# Patient Record
Sex: FEMALE | Race: WHITE | NOT HISPANIC OR LATINO
[De-identification: names, ages, dates, MRNs, and addresses within clinical notes are randomized per-mention and may not be internally consistent; named-entity substitution may affect disease eponyms.]

---

## 2017-03-07 ENCOUNTER — APPOINTMENT (OUTPATIENT)
Dept: OBGYN | Facility: CLINIC | Age: 32
End: 2017-03-07

## 2017-03-07 VITALS
WEIGHT: 205 LBS | BODY MASS INDEX: 34.16 KG/M2 | HEIGHT: 65 IN | DIASTOLIC BLOOD PRESSURE: 80 MMHG | SYSTOLIC BLOOD PRESSURE: 118 MMHG

## 2017-03-07 DIAGNOSIS — Z12.4 ENCOUNTER FOR GYNECOLOGICAL EXAMINATION (GENERAL) (ROUTINE) W/OUT ABNORMAL FINDINGS: ICD-10-CM

## 2017-03-07 DIAGNOSIS — Z01.419 ENCOUNTER FOR GYNECOLOGICAL EXAMINATION (GENERAL) (ROUTINE) W/OUT ABNORMAL FINDINGS: ICD-10-CM

## 2017-03-09 LAB — HPV HIGH+LOW RISK DNA PNL CVX: NEGATIVE

## 2017-03-10 LAB — CYTOLOGY CVX/VAG DOC THIN PREP: NORMAL

## 2017-11-01 ENCOUNTER — RX RENEWAL (OUTPATIENT)
Age: 32
End: 2017-11-01

## 2017-11-15 ENCOUNTER — MOBILE ON CALL (OUTPATIENT)
Age: 32
End: 2017-11-15

## 2017-11-25 ENCOUNTER — RX RENEWAL (OUTPATIENT)
Age: 32
End: 2017-11-25

## 2017-12-14 ENCOUNTER — APPOINTMENT (OUTPATIENT)
Dept: GASTROENTEROLOGY | Facility: CLINIC | Age: 32
End: 2017-12-14

## 2018-02-22 ENCOUNTER — APPOINTMENT (OUTPATIENT)
Dept: GASTROENTEROLOGY | Facility: CLINIC | Age: 33
End: 2018-02-22
Payer: COMMERCIAL

## 2018-02-22 VITALS
SYSTOLIC BLOOD PRESSURE: 134 MMHG | OXYGEN SATURATION: 98 % | WEIGHT: 189 LBS | RESPIRATION RATE: 16 BRPM | HEART RATE: 77 BPM | BODY MASS INDEX: 31.49 KG/M2 | DIASTOLIC BLOOD PRESSURE: 75 MMHG | HEIGHT: 65 IN

## 2018-02-22 PROCEDURE — 99214 OFFICE O/P EST MOD 30 MIN: CPT

## 2018-02-27 ENCOUNTER — APPOINTMENT (OUTPATIENT)
Dept: MRI IMAGING | Facility: CLINIC | Age: 33
End: 2018-02-27

## 2018-02-27 ENCOUNTER — OUTPATIENT (OUTPATIENT)
Dept: OUTPATIENT SERVICES | Facility: HOSPITAL | Age: 33
LOS: 1 days | End: 2018-02-27
Payer: COMMERCIAL

## 2018-02-27 DIAGNOSIS — K50.80 CROHN'S DISEASE OF BOTH SMALL AND LARGE INTESTINE WITHOUT COMPLICATIONS: ICD-10-CM

## 2018-02-27 PROCEDURE — A9585: CPT

## 2018-02-27 PROCEDURE — 72197 MRI PELVIS W/O & W/DYE: CPT

## 2018-02-27 PROCEDURE — 74183 MRI ABD W/O CNTR FLWD CNTR: CPT

## 2018-02-27 PROCEDURE — 74183 MRI ABD W/O CNTR FLWD CNTR: CPT | Mod: 26

## 2018-02-27 PROCEDURE — 72197 MRI PELVIS W/O & W/DYE: CPT | Mod: 26

## 2018-03-02 ENCOUNTER — RX RENEWAL (OUTPATIENT)
Age: 33
End: 2018-03-02

## 2018-03-05 ENCOUNTER — CLINICAL ADVICE (OUTPATIENT)
Age: 33
End: 2018-03-05

## 2018-03-05 LAB
ALBUMIN SERPL ELPH-MCNC: 4 G/DL
ALP BLD-CCNC: 97 U/L
ALT SERPL-CCNC: 13 U/L
ANION GAP SERPL CALC-SCNC: 12 MMOL/L
AST SERPL-CCNC: 15 U/L
BASOPHILS # BLD AUTO: 0.02 K/UL
BASOPHILS NFR BLD AUTO: 0.1 %
BILIRUB SERPL-MCNC: 0.3 MG/DL
BUN SERPL-MCNC: 6 MG/DL
CALCIUM SERPL-MCNC: 9.4 MG/DL
CHLORIDE SERPL-SCNC: 101 MMOL/L
CO2 SERPL-SCNC: 25 MMOL/L
CREAT SERPL-MCNC: 0.78 MG/DL
CRP SERPL-MCNC: 2 MG/DL
EOSINOPHIL # BLD AUTO: 0.17 K/UL
EOSINOPHIL NFR BLD AUTO: 1.2 %
GLUCOSE SERPL-MCNC: 94 MG/DL
HCT VFR BLD CALC: 42 %
HGB BLD-MCNC: 13.5 G/DL
IMM GRANULOCYTES NFR BLD AUTO: 0.4 %
LYMPHOCYTES # BLD AUTO: 3.34 K/UL
LYMPHOCYTES NFR BLD AUTO: 23.4 %
MAN DIFF?: NORMAL
MCHC RBC-ENTMCNC: 29.2 PG
MCHC RBC-ENTMCNC: 32.1 GM/DL
MCV RBC AUTO: 90.9 FL
MONOCYTES # BLD AUTO: 1.04 K/UL
MONOCYTES NFR BLD AUTO: 7.3 %
NEUTROPHILS # BLD AUTO: 9.66 K/UL
NEUTROPHILS NFR BLD AUTO: 67.6 %
PLATELET # BLD AUTO: 480 K/UL
POTASSIUM SERPL-SCNC: 4.8 MMOL/L
PROT SERPL-MCNC: 7.6 G/DL
RBC # BLD: 4.62 M/UL
RBC # FLD: 13.9 %
SODIUM SERPL-SCNC: 138 MMOL/L
WBC # FLD AUTO: 14.29 K/UL

## 2018-03-09 LAB
ADJUSTED MITOGEN: 3.49 IU/ML
ADJUSTED TB AG: -0.03 IU/ML
M TB IFN-G BLD-IMP: NEGATIVE
QUANTIFERON GOLD NIL: 0.13 IU/ML

## 2018-03-16 ENCOUNTER — APPOINTMENT (OUTPATIENT)
Dept: GASTROENTEROLOGY | Facility: CLINIC | Age: 33
End: 2018-03-16
Payer: COMMERCIAL

## 2018-03-16 DIAGNOSIS — Z87.42 PERSONAL HISTORY OF OTHER DISEASES OF THE FEMALE GENITAL TRACT: ICD-10-CM

## 2018-03-16 DIAGNOSIS — Z87.19 PERSONAL HISTORY OF OTHER DISEASES OF THE DIGESTIVE SYSTEM: ICD-10-CM

## 2018-03-16 PROCEDURE — 96415 CHEMO IV INFUSION ADDL HR: CPT | Mod: 59

## 2018-03-16 PROCEDURE — 96413 CHEMO IV INFUSION 1 HR: CPT

## 2018-03-16 PROCEDURE — 99213 OFFICE O/P EST LOW 20 MIN: CPT | Mod: 25

## 2018-03-16 RX ORDER — ADALIMUMAB 40MG/0.8ML
40 KIT SUBCUTANEOUS
Qty: 1 | Refills: 6 | Status: DISCONTINUED | COMMUNITY
Start: 2017-11-25 | End: 2018-03-16

## 2018-04-02 ENCOUNTER — APPOINTMENT (OUTPATIENT)
Dept: GASTROENTEROLOGY | Facility: CLINIC | Age: 33
End: 2018-04-02
Payer: COMMERCIAL

## 2018-04-02 PROCEDURE — 99213 OFFICE O/P EST LOW 20 MIN: CPT | Mod: 25

## 2018-04-02 PROCEDURE — 96413 CHEMO IV INFUSION 1 HR: CPT

## 2018-04-02 PROCEDURE — 96415 CHEMO IV INFUSION ADDL HR: CPT

## 2018-04-04 ENCOUNTER — MOBILE ON CALL (OUTPATIENT)
Age: 33
End: 2018-04-04

## 2018-04-05 VITALS
HEART RATE: 80 BPM | DIASTOLIC BLOOD PRESSURE: 70 MMHG | BODY MASS INDEX: 31.49 KG/M2 | RESPIRATION RATE: 20 BRPM | OXYGEN SATURATION: 100 % | SYSTOLIC BLOOD PRESSURE: 116 MMHG | WEIGHT: 189 LBS | HEIGHT: 65 IN

## 2018-04-06 ENCOUNTER — TRANSCRIPTION ENCOUNTER (OUTPATIENT)
Age: 33
End: 2018-04-06

## 2018-05-01 ENCOUNTER — APPOINTMENT (OUTPATIENT)
Dept: GASTROENTEROLOGY | Facility: CLINIC | Age: 33
End: 2018-05-01

## 2018-05-04 ENCOUNTER — APPOINTMENT (OUTPATIENT)
Dept: GASTROENTEROLOGY | Facility: CLINIC | Age: 33
End: 2018-05-04
Payer: COMMERCIAL

## 2018-05-04 VITALS
BODY MASS INDEX: 31.65 KG/M2 | WEIGHT: 190 LBS | HEIGHT: 65 IN | HEART RATE: 80 BPM | TEMPERATURE: 98 F | SYSTOLIC BLOOD PRESSURE: 120 MMHG | RESPIRATION RATE: 12 BRPM | DIASTOLIC BLOOD PRESSURE: 70 MMHG

## 2018-05-04 PROCEDURE — 96415 CHEMO IV INFUSION ADDL HR: CPT | Mod: 59

## 2018-05-04 PROCEDURE — 96413 CHEMO IV INFUSION 1 HR: CPT

## 2018-05-04 PROCEDURE — 99213 OFFICE O/P EST LOW 20 MIN: CPT | Mod: 25

## 2018-05-04 RX ORDER — METRONIDAZOLE 500 MG/1
500 TABLET ORAL 3 TIMES DAILY
Qty: 42 | Refills: 0 | Status: DISCONTINUED | COMMUNITY
Start: 2018-03-02 | End: 2018-05-04

## 2018-05-04 RX ORDER — CIPROFLOXACIN HYDROCHLORIDE 500 MG/1
500 TABLET, FILM COATED ORAL
Qty: 28 | Refills: 0 | Status: DISCONTINUED | COMMUNITY
Start: 2018-03-02 | End: 2018-05-04

## 2018-05-04 RX ORDER — PREDNISONE 5 MG/1
5 TABLET ORAL TWICE DAILY
Qty: 240 | Refills: 0 | Status: DISCONTINUED | COMMUNITY
Start: 2017-11-01 | End: 2018-05-04

## 2018-06-26 ENCOUNTER — APPOINTMENT (OUTPATIENT)
Dept: GASTROENTEROLOGY | Facility: CLINIC | Age: 33
End: 2018-06-26
Payer: COMMERCIAL

## 2018-06-26 PROCEDURE — 96413 CHEMO IV INFUSION 1 HR: CPT

## 2018-06-26 PROCEDURE — 96415 CHEMO IV INFUSION ADDL HR: CPT

## 2018-06-26 PROCEDURE — 99213 OFFICE O/P EST LOW 20 MIN: CPT | Mod: 25

## 2018-08-14 ENCOUNTER — APPOINTMENT (OUTPATIENT)
Dept: GASTROENTEROLOGY | Facility: CLINIC | Age: 33
End: 2018-08-14
Payer: COMMERCIAL

## 2018-08-14 PROCEDURE — 99213 OFFICE O/P EST LOW 20 MIN: CPT | Mod: 25

## 2018-08-14 PROCEDURE — 96415 CHEMO IV INFUSION ADDL HR: CPT

## 2018-08-14 PROCEDURE — 96413 CHEMO IV INFUSION 1 HR: CPT

## 2018-08-17 VITALS
HEIGHT: 65 IN | BODY MASS INDEX: 31.65 KG/M2 | TEMPERATURE: 98 F | HEART RATE: 80 BPM | WEIGHT: 190 LBS | SYSTOLIC BLOOD PRESSURE: 124 MMHG | RESPIRATION RATE: 12 BRPM | DIASTOLIC BLOOD PRESSURE: 74 MMHG

## 2018-10-02 ENCOUNTER — APPOINTMENT (OUTPATIENT)
Dept: GASTROENTEROLOGY | Facility: CLINIC | Age: 33
End: 2018-10-02
Payer: COMMERCIAL

## 2018-10-02 PROCEDURE — 99213 OFFICE O/P EST LOW 20 MIN: CPT | Mod: 25

## 2018-10-02 PROCEDURE — 96415 CHEMO IV INFUSION ADDL HR: CPT

## 2018-10-02 PROCEDURE — 96413 CHEMO IV INFUSION 1 HR: CPT

## 2018-11-27 ENCOUNTER — APPOINTMENT (OUTPATIENT)
Dept: GASTROENTEROLOGY | Facility: CLINIC | Age: 33
End: 2018-11-27
Payer: COMMERCIAL

## 2018-11-27 PROCEDURE — 96413 CHEMO IV INFUSION 1 HR: CPT

## 2018-11-27 PROCEDURE — 96415 CHEMO IV INFUSION ADDL HR: CPT | Mod: 59

## 2018-11-27 PROCEDURE — 99213 OFFICE O/P EST LOW 20 MIN: CPT | Mod: 25

## 2018-11-28 ENCOUNTER — CLINICAL ADVICE (OUTPATIENT)
Age: 33
End: 2018-11-28

## 2019-01-22 ENCOUNTER — APPOINTMENT (OUTPATIENT)
Dept: GASTROENTEROLOGY | Facility: CLINIC | Age: 34
End: 2019-01-22
Payer: COMMERCIAL

## 2019-01-22 PROCEDURE — 96413 CHEMO IV INFUSION 1 HR: CPT

## 2019-01-22 PROCEDURE — 96415 CHEMO IV INFUSION ADDL HR: CPT

## 2019-01-22 PROCEDURE — 99213 OFFICE O/P EST LOW 20 MIN: CPT | Mod: 25

## 2019-01-22 NOTE — ASSESSMENT
[FreeTextEntry1] : patient will get her usual infusion of Remicade have a follow infusion and 8 weeks. We will also review her recent blood work.

## 2019-01-22 NOTE — HISTORY OF PRESENT ILLNESS
[de-identified] : patient history of Crohn's disease on Remicade 5 mg per kilogram. The patient is symptomatically got diarrhea abdominal pain fever chills or extra intestinal manifestations of her disease.Patient states she had recent blood work which is not available today

## 2019-01-22 NOTE — PHYSICAL EXAM
[General Appearance - Alert] : alert [General Appearance - In No Acute Distress] : in no acute distress [Sclera] : the sclera and conjunctiva were normal [PERRL With Normal Accommodation] : pupils were equal in size, round, and reactive to light [Extraocular Movements] : extraocular movements were intact [Auscultation Breath Sounds / Voice Sounds] : lungs were clear to auscultation bilaterally [Heart Rate And Rhythm] : heart rate was normal and rhythm regular [Heart Sounds] : normal S1 and S2 [Heart Sounds Gallop] : no gallops [Murmurs] : no murmurs [Heart Sounds Pericardial Friction Rub] : no pericardial rub [Bowel Sounds] : normal bowel sounds [Abdomen Soft] : soft [Abdomen Tenderness] : non-tender [Abdomen Mass (___ Cm)] : no abdominal mass palpated [Musculoskeletal - Swelling] : no joint swelling seen [] : no rash [Oriented To Time, Place, And Person] : oriented to person, place, and time [Impaired Insight] : insight and judgment were intact [Affect] : the affect was normal

## 2019-03-20 ENCOUNTER — TRANSCRIPTION ENCOUNTER (OUTPATIENT)
Age: 34
End: 2019-03-20

## 2019-03-20 ENCOUNTER — RX RENEWAL (OUTPATIENT)
Age: 34
End: 2019-03-20

## 2019-03-20 ENCOUNTER — APPOINTMENT (OUTPATIENT)
Dept: GASTROENTEROLOGY | Facility: CLINIC | Age: 34
End: 2019-03-20
Payer: COMMERCIAL

## 2019-03-20 PROCEDURE — 96413 CHEMO IV INFUSION 1 HR: CPT

## 2019-03-20 PROCEDURE — 96415 CHEMO IV INFUSION ADDL HR: CPT | Mod: 59

## 2019-03-20 PROCEDURE — 99213 OFFICE O/P EST LOW 20 MIN: CPT | Mod: 25

## 2019-03-20 NOTE — HISTORY OF PRESENT ILLNESS
[de-identified] : She is here for her every eight-week infusion of Remicade at a dose of 5 mg per kilogram for underlying Crohn's ileocolitis. She has had a good response to this medication is essentially asymptomatic. She is tolerating the infusion without difficulty.

## 2019-03-20 NOTE — PHYSICAL EXAM
[General Appearance - Alert] : alert [General Appearance - In No Acute Distress] : in no acute distress [General Appearance - Well Developed] : well developed [General Appearance - Well Nourished] : well nourished [General Appearance - Well-Appearing] : healthy appearing [Extraocular Movements] : extraocular movements were intact [PERRL With Normal Accommodation] : pupils were equal in size, round, and reactive to light [Sclera] : the sclera and conjunctiva were normal [Outer Ear] : the ears and nose were normal in appearance [Hearing Threshold Finger Rub Not [place]] : hearing was normal [Examination Of The Oral Cavity] : the lips and gums were normal [Neck Appearance] : the appearance of the neck was normal [Bowel Sounds] : normal bowel sounds [Heart Rate And Rhythm] : heart rate was normal and rhythm regular [Abdomen Tenderness] : non-tender [Abdomen Soft] : soft [Abdomen Mass (___ Cm)] : no abdominal mass palpated [Abnormal Walk] : normal gait [Nail Clubbing] : no clubbing  or cyanosis of the fingernails [Musculoskeletal - Swelling] : no joint swelling seen [Skin Color & Pigmentation] : normal skin color and pigmentation [Skin Turgor] : normal skin turgor [] : no rash [Motor Exam] : the motor exam was normal [No Focal Deficits] : no focal deficits [Oriented To Time, Place, And Person] : oriented to person, place, and time [Impaired Insight] : insight and judgment were intact [Affect] : the affect was normal

## 2019-03-28 LAB
ALBUMIN SERPL ELPH-MCNC: 3.8 G/DL
ALP BLD-CCNC: 89 U/L
ALT SERPL-CCNC: 16 U/L
ANION GAP SERPL CALC-SCNC: 10 MMOL/L
AST SERPL-CCNC: 14 U/L
BASOPHILS # BLD AUTO: 0.04 K/UL
BASOPHILS NFR BLD AUTO: 0.4 %
BILIRUB SERPL-MCNC: 0.2 MG/DL
BUN SERPL-MCNC: 7 MG/DL
CALCIUM SERPL-MCNC: 8.8 MG/DL
CHLORIDE SERPL-SCNC: 103 MMOL/L
CO2 SERPL-SCNC: 24 MMOL/L
CREAT SERPL-MCNC: 0.7 MG/DL
EOSINOPHIL # BLD AUTO: 0.14 K/UL
EOSINOPHIL NFR BLD AUTO: 1.2 %
GLUCOSE SERPL-MCNC: 67 MG/DL
HCT VFR BLD CALC: 41.9 %
HGB BLD-MCNC: 13.4 G/DL
IMM GRANULOCYTES NFR BLD AUTO: 0.3 %
LYMPHOCYTES # BLD AUTO: 3.14 K/UL
LYMPHOCYTES NFR BLD AUTO: 27.7 %
MAN DIFF?: NORMAL
MCHC RBC-ENTMCNC: 29 PG
MCHC RBC-ENTMCNC: 32 GM/DL
MCV RBC AUTO: 90.7 FL
MONOCYTES # BLD AUTO: 0.67 K/UL
MONOCYTES NFR BLD AUTO: 5.9 %
NEUTROPHILS # BLD AUTO: 7.32 K/UL
NEUTROPHILS NFR BLD AUTO: 64.5 %
PLATELET # BLD AUTO: 429 K/UL
POTASSIUM SERPL-SCNC: 4.6 MMOL/L
PROT SERPL-MCNC: 7 G/DL
RBC # BLD: 4.62 M/UL
RBC # FLD: 12.5 %
SODIUM SERPL-SCNC: 137 MMOL/L
WBC # FLD AUTO: 11.34 K/UL

## 2019-04-02 ENCOUNTER — RESULT REVIEW (OUTPATIENT)
Age: 34
End: 2019-04-02

## 2019-04-02 LAB
M TB IFN-G BLD-IMP: NEGATIVE
QUANTIFERON TB PLUS MITOGEN MINUS NIL: 5.35 IU/ML
QUANTIFERON TB PLUS NIL: 0.03 IU/ML
QUANTIFERON TB PLUS TB1 MINUS NIL: 0.01 IU/ML
QUANTIFERON TB PLUS TB2 MINUS NIL: 0 IU/ML

## 2019-05-15 ENCOUNTER — APPOINTMENT (OUTPATIENT)
Dept: GASTROENTEROLOGY | Facility: CLINIC | Age: 34
End: 2019-05-15
Payer: COMMERCIAL

## 2019-05-15 PROCEDURE — 96415 CHEMO IV INFUSION ADDL HR: CPT

## 2019-05-15 PROCEDURE — 96413 CHEMO IV INFUSION 1 HR: CPT

## 2019-05-15 PROCEDURE — 99213 OFFICE O/P EST LOW 20 MIN: CPT | Mod: 25

## 2019-05-15 NOTE — HISTORY OF PRESENT ILLNESS
[de-identified] : The patient is here for every a week infusion of Remicade at a dose of 5 mg per kilogram for her underlying Crohn's ileocolitis. She is tolerating infusion without any difficulty and is feeling well with no gastrointestinal complaints.

## 2019-05-15 NOTE — ASSESSMENT
[FreeTextEntry1] : At this time she is doing well we will simply continue with the same medical regimen.

## 2019-05-15 NOTE — PHYSICAL EXAM
[General Appearance - Alert] : alert [General Appearance - In No Acute Distress] : in no acute distress [General Appearance - Well Nourished] : well nourished [General Appearance - Well Developed] : well developed [General Appearance - Well-Appearing] : healthy appearing [Sclera] : the sclera and conjunctiva were normal [PERRL With Normal Accommodation] : pupils were equal in size, round, and reactive to light [Extraocular Movements] : extraocular movements were intact [Outer Ear] : the ears and nose were normal in appearance [Hearing Threshold Finger Rub Not [place]] : hearing was normal [Examination Of The Oral Cavity] : the lips and gums were normal [Neck Appearance] : the appearance of the neck was normal [Heart Rate And Rhythm] : heart rate was normal and rhythm regular [Bowel Sounds] : normal bowel sounds [Abdomen Soft] : soft [Abdomen Tenderness] : non-tender [Abdomen Mass (___ Cm)] : no abdominal mass palpated [Musculoskeletal - Swelling] : no joint swelling seen [Abnormal Walk] : normal gait [Nail Clubbing] : no clubbing  or cyanosis of the fingernails [Skin Turgor] : normal skin turgor [Skin Color & Pigmentation] : normal skin color and pigmentation [] : no rash [Motor Exam] : the motor exam was normal [No Focal Deficits] : no focal deficits [Affect] : the affect was normal [Impaired Insight] : insight and judgment were intact [Oriented To Time, Place, And Person] : oriented to person, place, and time

## 2019-06-19 ENCOUNTER — MOBILE ON CALL (OUTPATIENT)
Age: 34
End: 2019-06-19

## 2019-06-27 ENCOUNTER — APPOINTMENT (OUTPATIENT)
Dept: GASTROENTEROLOGY | Facility: CLINIC | Age: 34
End: 2019-06-27
Payer: COMMERCIAL

## 2019-06-27 VITALS
HEART RATE: 82 BPM | SYSTOLIC BLOOD PRESSURE: 119 MMHG | HEIGHT: 65 IN | BODY MASS INDEX: 33.32 KG/M2 | WEIGHT: 200 LBS | OXYGEN SATURATION: 99 % | DIASTOLIC BLOOD PRESSURE: 83 MMHG | RESPIRATION RATE: 14 BRPM

## 2019-06-27 PROCEDURE — 99214 OFFICE O/P EST MOD 30 MIN: CPT

## 2019-06-27 NOTE — HISTORY OF PRESENT ILLNESS
[de-identified] : patient knows Crohn's ileocolitis being treated with Remicade 5 mg per kilogram every 8 weeks has been doing well until a few weeks ago started noting some lower abdominal pain usually after meals. The symptoms do not wake her from sleep at night. She had no diarrhea rectal bleeding fever chills joint pains rashes or extra intestinal manifestations of her disease. Undergoing a lot of stress due to family issues. Patient did have a history of abscess ease and had been treated in the past but these had been seen to resolve.

## 2019-06-27 NOTE — PHYSICAL EXAM
[General Appearance - Alert] : alert [General Appearance - In No Acute Distress] : in no acute distress [Sclera] : the sclera and conjunctiva were normal [PERRL With Normal Accommodation] : pupils were equal in size, round, and reactive to light [Extraocular Movements] : extraocular movements were intact [Outer Ear] : the ears and nose were normal in appearance [Oropharynx] : the oropharynx was normal [Neck Appearance] : the appearance of the neck was normal [Jugular Venous Distention Increased] : there was no jugular-venous distention [Neck Cervical Mass (___cm)] : no neck mass was observed [Thyroid Nodule] : there were no palpable thyroid nodules [Thyroid Diffuse Enlargement] : the thyroid was not enlarged [Auscultation Breath Sounds / Voice Sounds] : lungs were clear to auscultation bilaterally [Heart Rate And Rhythm] : heart rate was normal and rhythm regular [Murmurs] : no murmurs [Heart Sounds] : normal S1 and S2 [Heart Sounds Gallop] : no gallops [Heart Sounds Pericardial Friction Rub] : no pericardial rub [Edema] : there was no peripheral edema [Abdomen Soft] : soft [Bowel Sounds] : normal bowel sounds [Abdomen Tenderness] : non-tender [Abdomen Mass (___ Cm)] : no abdominal mass palpated [No CVA Tenderness] : no ~M costovertebral angle tenderness [FreeTextEntry1] : [place] [] : no rash [Musculoskeletal - Swelling] : no joint swelling seen [Oriented To Time, Place, And Person] : oriented to person, place, and time [Impaired Insight] : insight and judgment were intact [Affect] : the affect was normal

## 2019-06-27 NOTE — ASSESSMENT
[FreeTextEntry1] : I discussed with the patient that it would be important to make sure that her symptoms are not related to a flare of her Crohn's disease despite the fact that she is taking Remicade. We will order and Flexima levels and antibodies as well as to an abdominal pelvic CT scan to rule out recurrent disease. It is possible that this is all due to stress as well. She will call with the results of tests and I will determine further evaluation and treatment.

## 2019-06-28 ENCOUNTER — FORM ENCOUNTER (OUTPATIENT)
Age: 34
End: 2019-06-28

## 2019-06-28 LAB
ALBUMIN SERPL ELPH-MCNC: 3.8 G/DL
ALP BLD-CCNC: 86 U/L
ALT SERPL-CCNC: 13 U/L
ANION GAP SERPL CALC-SCNC: 9 MMOL/L
AST SERPL-CCNC: 11 U/L
BASOPHILS # BLD AUTO: 0.03 K/UL
BASOPHILS NFR BLD AUTO: 0.3 %
BILIRUB SERPL-MCNC: 0.2 MG/DL
BUN SERPL-MCNC: 7 MG/DL
CALCIUM SERPL-MCNC: 8.9 MG/DL
CHLORIDE SERPL-SCNC: 104 MMOL/L
CO2 SERPL-SCNC: 26 MMOL/L
CREAT SERPL-MCNC: 0.77 MG/DL
EOSINOPHIL # BLD AUTO: 0.25 K/UL
EOSINOPHIL NFR BLD AUTO: 2.1 %
GLUCOSE SERPL-MCNC: 104 MG/DL
HCT VFR BLD CALC: 40.3 %
HGB BLD-MCNC: 12.9 G/DL
IMM GRANULOCYTES NFR BLD AUTO: 0.4 %
LYMPHOCYTES # BLD AUTO: 3.31 K/UL
LYMPHOCYTES NFR BLD AUTO: 28.3 %
MAN DIFF?: NORMAL
MCHC RBC-ENTMCNC: 28.9 PG
MCHC RBC-ENTMCNC: 32 GM/DL
MCV RBC AUTO: 90.2 FL
MONOCYTES # BLD AUTO: 1.03 K/UL
MONOCYTES NFR BLD AUTO: 8.8 %
NEUTROPHILS # BLD AUTO: 7.02 K/UL
NEUTROPHILS NFR BLD AUTO: 60.1 %
PLATELET # BLD AUTO: 391 K/UL
POTASSIUM SERPL-SCNC: 4.4 MMOL/L
PROT SERPL-MCNC: 7 G/DL
RBC # BLD: 4.47 M/UL
RBC # FLD: 12.6 %
SODIUM SERPL-SCNC: 139 MMOL/L
WBC # FLD AUTO: 11.69 K/UL

## 2019-06-29 ENCOUNTER — OUTPATIENT (OUTPATIENT)
Dept: OUTPATIENT SERVICES | Facility: HOSPITAL | Age: 34
LOS: 1 days | End: 2019-06-29
Payer: COMMERCIAL

## 2019-06-29 ENCOUNTER — APPOINTMENT (OUTPATIENT)
Dept: CT IMAGING | Facility: CLINIC | Age: 34
End: 2019-06-29
Payer: COMMERCIAL

## 2019-06-29 DIAGNOSIS — K50.80 CROHN'S DISEASE OF BOTH SMALL AND LARGE INTESTINE WITHOUT COMPLICATIONS: ICD-10-CM

## 2019-06-29 DIAGNOSIS — Z00.00 ENCOUNTER FOR GENERAL ADULT MEDICAL EXAMINATION WITHOUT ABNORMAL FINDINGS: ICD-10-CM

## 2019-06-29 PROCEDURE — 74177 CT ABD & PELVIS W/CONTRAST: CPT | Mod: 26

## 2019-06-29 PROCEDURE — 74177 CT ABD & PELVIS W/CONTRAST: CPT

## 2019-07-03 ENCOUNTER — RESULT REVIEW (OUTPATIENT)
Age: 34
End: 2019-07-03

## 2019-07-05 ENCOUNTER — MOBILE ON CALL (OUTPATIENT)
Age: 34
End: 2019-07-05

## 2019-07-05 LAB
INFLIXIMAB AB SERPL-MCNC: <22 NG/ML
INFLIXIMAB SERPL-MCNC: 5 UG/ML

## 2019-07-10 ENCOUNTER — APPOINTMENT (OUTPATIENT)
Dept: GASTROENTEROLOGY | Facility: CLINIC | Age: 34
End: 2019-07-10
Payer: COMMERCIAL

## 2019-07-10 PROCEDURE — 99213 OFFICE O/P EST LOW 20 MIN: CPT | Mod: 25

## 2019-07-10 PROCEDURE — 96415 CHEMO IV INFUSION ADDL HR: CPT

## 2019-07-10 PROCEDURE — 96413 CHEMO IV INFUSION 1 HR: CPT

## 2019-07-10 NOTE — PHYSICAL EXAM
[General Appearance - Alert] : alert [General Appearance - In No Acute Distress] : in no acute distress [Sclera] : the sclera and conjunctiva were normal [PERRL With Normal Accommodation] : pupils were equal in size, round, and reactive to light [Extraocular Movements] : extraocular movements were intact [Outer Ear] : the ears and nose were normal in appearance [Oropharynx] : the oropharynx was normal [Neck Appearance] : the appearance of the neck was normal [Jugular Venous Distention Increased] : there was no jugular-venous distention [Neck Cervical Mass (___cm)] : no neck mass was observed [Thyroid Diffuse Enlargement] : the thyroid was not enlarged [Thyroid Nodule] : there were no palpable thyroid nodules [Auscultation Breath Sounds / Voice Sounds] : lungs were clear to auscultation bilaterally [Heart Rate And Rhythm] : heart rate was normal and rhythm regular [Heart Sounds] : normal S1 and S2 [Murmurs] : no murmurs [Heart Sounds Gallop] : no gallops [Bowel Sounds] : normal bowel sounds [Heart Sounds Pericardial Friction Rub] : no pericardial rub [Abdomen Soft] : soft [Abdomen Tenderness] : non-tender [] : no hepato-splenomegaly [Abdomen Mass (___ Cm)] : no abdominal mass palpated

## 2019-07-10 NOTE — ASSESSMENT
[FreeTextEntry1] : Visualizing Crohn's disease with ileocolitis. Primarily pain. No diarrhea or obvious infection. No abscess. No bleeding\par Infusion of infliximab was increased to 10 mg per kilogram and today represented the first dose. Tolerated well. No medication side effects. Taking no other medications for her Crohn's disease.\par Advise repeat infusion here in 8 weeks. Office for evaluation with Dr. Hobbs on p.r.n. basis.

## 2019-07-10 NOTE — HISTORY OF PRESENT ILLNESS
[FreeTextEntry1] : 34-year-old, white female, long history of Crohn's disease. Distant abscess and lately with specializing Crohn's disease of the small bowel with recent increasing pain in the right lower quadrant. Despite having been on Remicade in standard doses, 5 mg per kilogram every 8 weeks.  Recent blood work was normal and Quantiferon Gold was negative. Infliximab level was low, had 5 mg/dL. 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 No bowel obstruction, abscess. Trace fluid in pelvis.\par Decision was made to increase the infliximab from 5-10 mg per kilogram per infusion. Patient reports that she had previously been a treatment failure with Humira. \par Patient was premedicated with Tylenol and Zyrtec.\par Patient was infused with 10 bile spell of Remicade, i.e., 1000 mg, i.e., 10 mg per kilogram over 2.5 hours. Infusion was well tolerated. No hypotension or rashes.

## 2019-07-30 ENCOUNTER — APPOINTMENT (OUTPATIENT)
Dept: GASTROENTEROLOGY | Facility: CLINIC | Age: 34
End: 2019-07-30
Payer: COMMERCIAL

## 2019-07-30 VITALS
OXYGEN SATURATION: 99 % | HEIGHT: 65 IN | DIASTOLIC BLOOD PRESSURE: 84 MMHG | HEART RATE: 86 BPM | WEIGHT: 210 LBS | RESPIRATION RATE: 14 BRPM | SYSTOLIC BLOOD PRESSURE: 126 MMHG | BODY MASS INDEX: 34.99 KG/M2 | TEMPERATURE: 99.1 F

## 2019-07-30 DIAGNOSIS — R59.9 ENLARGED LYMPH NODES, UNSPECIFIED: ICD-10-CM

## 2019-07-30 PROCEDURE — 99214 OFFICE O/P EST MOD 30 MIN: CPT

## 2019-07-30 NOTE — ASSESSMENT
[FreeTextEntry1] : I discussed with the patient that he enlarged lymph nodes could be due to a viral illness or possibly months or mono. We will get appropriate serologic testing as well as a CBC CMP and CRP and she will call if any results. She is not due for followup Remicade until early September so there is no reason to change her treatment at this time. If her symptoms persist we'll consider CAT scan possible infectious disease consultation. Would it would be unusual to see enlarged lymph nodes in neck related to Crohn's disease but certainly if her symptoms persist we will explore this.Also I fee lymphadenopathy

## 2019-07-30 NOTE — HISTORY OF PRESENT ILLNESS
[de-identified] : 2 history of Crohn disease aboua month ago had increasing symptoms and found to have f and radiologically worst disease on CT scan. She was increased to 10 mg per kilogram per day for her Remicade and was feeling good and asymptomatic until 2 days ago when she noticed the onset of swollen glands which were uncomfortablher to take Motrin. She said sheior to that but no cough or sore throat or shortness of breath or fever she has no abdominal pain or diarrhea rectal bleeding or joint pains or other extra intestinal manifestations or disease.

## 2019-07-30 NOTE — PHYSICAL EXAM
[General Appearance - Alert] : alert [Oropharynx] : the oropharynx was normal [General Appearance - In No Acute Distress] : in no acute distress [Outer Ear] : the ears and nose were normal in appearance [Auscultation Breath Sounds / Voice Sounds] : lungs were clear to auscultation bilaterally [FreeTextEntry1] : enlarged tender  lymph nodes on both sides of the neck [Heart Rate And Rhythm] : heart rate was normal and rhythm regular [Heart Sounds] : normal S1 and S2 [Heart Sounds Gallop] : no gallops [Murmurs] : no murmurs [Heart Sounds Pericardial Friction Rub] : no pericardial rub [Bowel Sounds] : normal bowel sounds [Abdomen Soft] : soft [Abdomen Tenderness] : non-tender [Bilateral] : bilaterally [] : no rash [Abdomen Mass (___ Cm)] : no abdominal mass palpated [Oriented To Time, Place, And Person] : oriented to person, place, and time [Impaired Insight] : insight and judgment were intact [Affect] : the affect was normal

## 2019-08-01 ENCOUNTER — CLINICAL ADVICE (OUTPATIENT)
Age: 34
End: 2019-08-01

## 2019-08-01 LAB
ALBUMIN SERPL ELPH-MCNC: 3.9 G/DL
ALP BLD-CCNC: 89 U/L
ALT SERPL-CCNC: 11 U/L
ANION GAP SERPL CALC-SCNC: 10 MMOL/L
AST SERPL-CCNC: 11 U/L
BASOPHILS # BLD AUTO: 0.05 K/UL
BASOPHILS NFR BLD AUTO: 0.5 %
BILIRUB SERPL-MCNC: 0.3 MG/DL
BUN SERPL-MCNC: 7 MG/DL
CALCIUM SERPL-MCNC: 9.2 MG/DL
CHLORIDE SERPL-SCNC: 106 MMOL/L
CO2 SERPL-SCNC: 25 MMOL/L
CREAT SERPL-MCNC: 0.77 MG/DL
CRP SERPL-MCNC: 0.35 MG/DL
EOSINOPHIL # BLD AUTO: 0.25 K/UL
EOSINOPHIL NFR BLD AUTO: 2.6 %
GLUCOSE SERPL-MCNC: 96 MG/DL
HCT VFR BLD CALC: 41.4 %
HETEROPH AB SER QL: NEGATIVE
HETEROPH AB SER QL: NEGATIVE
HGB BLD-MCNC: 13.3 G/DL
IMM GRANULOCYTES NFR BLD AUTO: 0.4 %
LYMPHOCYTES # BLD AUTO: 1.64 K/UL
LYMPHOCYTES NFR BLD AUTO: 17 %
MAN DIFF?: NORMAL
MCHC RBC-ENTMCNC: 29.2 PG
MCHC RBC-ENTMCNC: 32.1 GM/DL
MCV RBC AUTO: 90.8 FL
MONOCYTES # BLD AUTO: 0.98 K/UL
MONOCYTES NFR BLD AUTO: 10.2 %
MUV AB SER-ACNC: POSITIVE
MUV IGG SER QL IA: 27.6 AU/ML
NEUTROPHILS # BLD AUTO: 6.68 K/UL
NEUTROPHILS NFR BLD AUTO: 69.3 %
PLATELET # BLD AUTO: 385 K/UL
POTASSIUM SERPL-SCNC: 4.5 MMOL/L
PROT SERPL-MCNC: 6.7 G/DL
RBC # BLD: 4.56 M/UL
RBC # FLD: 12.6 %
SODIUM SERPL-SCNC: 141 MMOL/L
WBC # FLD AUTO: 9.64 K/UL

## 2019-08-02 LAB — MUV IGM SER QL IA: <0.8 AU

## 2019-08-04 ENCOUNTER — MOBILE ON CALL (OUTPATIENT)
Age: 34
End: 2019-08-04

## 2019-08-05 ENCOUNTER — RESULT REVIEW (OUTPATIENT)
Age: 34
End: 2019-08-05

## 2019-08-05 LAB
CMV IGG SERPL QL: <0.2 U/ML
CMV IGG SERPL-IMP: NEGATIVE
CMV IGM SERPL QL: 12 AU/ML
CMV IGM SERPL QL: NEGATIVE
EBV EA AB SER IA-ACNC: 31.8 U/ML
EBV EA AB TITR SER IF: NEGATIVE
EBV EA IGG SER QL IA: 14.1 U/ML
EBV EA IGG SER-ACNC: POSITIVE
EBV EA IGM SER IA-ACNC: NEGATIVE
EBV PATRN SPEC IB-IMP: NORMAL
EBV VCA IGG SER IA-ACNC: 642 U/ML
EBV VCA IGM SER QL IA: <10 U/ML
EPSTEIN-BARR VIRUS CAPSID ANTIGEN IGG: POSITIVE

## 2019-09-03 ENCOUNTER — APPOINTMENT (OUTPATIENT)
Age: 34
End: 2019-09-03
Payer: COMMERCIAL

## 2019-09-03 PROCEDURE — 99213 OFFICE O/P EST LOW 20 MIN: CPT | Mod: 25

## 2019-09-03 PROCEDURE — 96415 CHEMO IV INFUSION ADDL HR: CPT

## 2019-09-03 PROCEDURE — 96413 CHEMO IV INFUSION 1 HR: CPT

## 2019-09-03 NOTE — HISTORY OF PRESENT ILLNESS
[de-identified] : She is here for her second infusion of the increased dose of Remicade at 10 mg per kilogram every 8 weeks for underlying Crohn's ileocolitis. Since undergoing the initial infusion of 10 mg per kilogram she is felt better with no significant complaints at this time and has tolerated the infusion without difficulty. Since her last visit she apparently developed some "swollen glands" in the cervical region which has since resolved.

## 2019-09-03 NOTE — ASSESSMENT
[FreeTextEntry1] : At this time she is doing well will simply continue with the same medical therapy.

## 2019-09-03 NOTE — PHYSICAL EXAM
[General Appearance - Alert] : alert [General Appearance - In No Acute Distress] : in no acute distress [General Appearance - Well Nourished] : well nourished [General Appearance - Well Developed] : well developed [General Appearance - Well-Appearing] : healthy appearing [PERRL With Normal Accommodation] : pupils were equal in size, round, and reactive to light [Sclera] : the sclera and conjunctiva were normal [Extraocular Movements] : extraocular movements were intact [Outer Ear] : the ears and nose were normal in appearance [Hearing Threshold Finger Rub Not [place]] : hearing was normal [Examination Of The Oral Cavity] : the lips and gums were normal [Neck Appearance] : the appearance of the neck was normal [Bowel Sounds] : normal bowel sounds [Heart Rate And Rhythm] : heart rate was normal and rhythm regular [Abdomen Tenderness] : non-tender [Abdomen Soft] : soft [Abdomen Mass (___ Cm)] : no abdominal mass palpated [Abnormal Walk] : normal gait [Nail Clubbing] : no clubbing  or cyanosis of the fingernails [Musculoskeletal - Swelling] : no joint swelling seen [Skin Color & Pigmentation] : normal skin color and pigmentation [] : no rash [Skin Turgor] : normal skin turgor [Motor Exam] : the motor exam was normal [No Focal Deficits] : no focal deficits [Oriented To Time, Place, And Person] : oriented to person, place, and time [Impaired Insight] : insight and judgment were intact [Affect] : the affect was normal

## 2019-10-29 ENCOUNTER — APPOINTMENT (OUTPATIENT)
Dept: GASTROENTEROLOGY | Facility: CLINIC | Age: 34
End: 2019-10-29
Payer: COMMERCIAL

## 2019-10-29 ENCOUNTER — OTHER (OUTPATIENT)
Age: 34
End: 2019-10-29

## 2019-10-29 VITALS
DIASTOLIC BLOOD PRESSURE: 78 MMHG | RESPIRATION RATE: 16 BRPM | HEIGHT: 65 IN | WEIGHT: 198 LBS | HEART RATE: 72 BPM | BODY MASS INDEX: 32.99 KG/M2 | TEMPERATURE: 98.1 F | SYSTOLIC BLOOD PRESSURE: 110 MMHG

## 2019-10-29 PROCEDURE — 99213 OFFICE O/P EST LOW 20 MIN: CPT | Mod: 25

## 2019-10-29 PROCEDURE — 96413 CHEMO IV INFUSION 1 HR: CPT

## 2019-10-29 PROCEDURE — 96415 CHEMO IV INFUSION ADDL HR: CPT

## 2019-10-29 NOTE — PHYSICAL EXAM
[General Appearance - Alert] : alert [General Appearance - In No Acute Distress] : in no acute distress [Sclera] : the sclera and conjunctiva were normal [PERRL With Normal Accommodation] : pupils were equal in size, round, and reactive to light [Extraocular Movements] : extraocular movements were intact [Auscultation Breath Sounds / Voice Sounds] : lungs were clear to auscultation bilaterally [Heart Rate And Rhythm] : heart rate was normal and rhythm regular [Heart Sounds] : normal S1 and S2 [Heart Sounds Gallop] : no gallops [Murmurs] : no murmurs [Heart Sounds Pericardial Friction Rub] : no pericardial rub [Bowel Sounds] : normal bowel sounds [Abdomen Soft] : soft [Abdomen Tenderness] : non-tender [] : no hepato-splenomegaly [Abdomen Mass (___ Cm)] : no abdominal mass palpated [FreeTextEntry1] : rash on fingers

## 2019-10-29 NOTE — HISTORY OF PRESENT ILLNESS
[de-identified] : patient with history of Crohn disease with recent flare put on Remicade at 10 mg per kilogram on the increased dose she is doing much better and is completely his hematocrit abdominal pain or diarrhea.the patient is here today for her Remicade infusion.Tolerated well

## 2019-12-19 ENCOUNTER — APPOINTMENT (OUTPATIENT)
Dept: GASTROENTEROLOGY | Facility: CLINIC | Age: 34
End: 2019-12-19
Payer: COMMERCIAL

## 2019-12-19 PROCEDURE — 96415 CHEMO IV INFUSION ADDL HR: CPT

## 2019-12-19 PROCEDURE — 96413 CHEMO IV INFUSION 1 HR: CPT

## 2019-12-19 PROCEDURE — 99213 OFFICE O/P EST LOW 20 MIN: CPT | Mod: 25

## 2019-12-19 NOTE — ASSESSMENT
[FreeTextEntry1] : the patient is doing well and was continue on her every 8 week regimen of Remicade 10 mg per kilogram IV

## 2019-12-19 NOTE — PHYSICAL EXAM
[General Appearance - Alert] : alert [Sclera] : the sclera and conjunctiva were normal [General Appearance - In No Acute Distress] : in no acute distress [PERRL With Normal Accommodation] : pupils were equal in size, round, and reactive to light [Extraocular Movements] : extraocular movements were intact [Outer Ear] : the ears and nose were normal in appearance [Oropharynx] : the oropharynx was normal [Neck Appearance] : the appearance of the neck was normal [Jugular Venous Distention Increased] : there was no jugular-venous distention [Neck Cervical Mass (___cm)] : no neck mass was observed [Thyroid Diffuse Enlargement] : the thyroid was not enlarged [Thyroid Nodule] : there were no palpable thyroid nodules [Auscultation Breath Sounds / Voice Sounds] : lungs were clear to auscultation bilaterally [Heart Rate And Rhythm] : heart rate was normal and rhythm regular [Heart Sounds] : normal S1 and S2 [Murmurs] : no murmurs [Heart Sounds Gallop] : no gallops [Edema] : there was no peripheral edema [Bowel Sounds] : normal bowel sounds [Heart Sounds Pericardial Friction Rub] : no pericardial rub [Abdomen Tenderness] : non-tender [Abdomen Soft] : soft [] : no hepato-splenomegaly [Abdomen Mass (___ Cm)] : no abdominal mass palpated

## 2019-12-19 NOTE — HISTORY OF PRESENT ILLNESS
[de-identified] : patient long history of Crohn's disease. She is currently a symptomatic abdominal pain fever chills constipation diarrhea rectal bleeding nausea vomiting weight loss or joint pains eye or kidney problems. She is complaining of a dermatitis and is being treated by a dermatologist and is currently on prednisone.

## 2020-02-11 ENCOUNTER — APPOINTMENT (OUTPATIENT)
Dept: GASTROENTEROLOGY | Facility: CLINIC | Age: 35
End: 2020-02-11
Payer: COMMERCIAL

## 2020-02-11 PROCEDURE — 96413 CHEMO IV INFUSION 1 HR: CPT

## 2020-02-11 PROCEDURE — 96415 CHEMO IV INFUSION ADDL HR: CPT

## 2020-02-11 PROCEDURE — 99213 OFFICE O/P EST LOW 20 MIN: CPT | Mod: 25

## 2020-02-11 NOTE — PHYSICAL EXAM
[General Appearance - In No Acute Distress] : in no acute distress [General Appearance - Alert] : alert [PERRL With Normal Accommodation] : pupils were equal in size, round, and reactive to light [Sclera] : the sclera and conjunctiva were normal [Extraocular Movements] : extraocular movements were intact [Auscultation Breath Sounds / Voice Sounds] : lungs were clear to auscultation bilaterally [Heart Rate And Rhythm] : heart rate was normal and rhythm regular [Heart Sounds] : normal S1 and S2 [Heart Sounds Pericardial Friction Rub] : no pericardial rub [Murmurs] : no murmurs [Heart Sounds Gallop] : no gallops [Abdomen Soft] : soft [Bowel Sounds] : normal bowel sounds [] : no hepato-splenomegaly [Abdomen Mass (___ Cm)] : no abdominal mass palpated [Abdomen Tenderness] : non-tender

## 2020-02-11 NOTE — HISTORY OF PRESENT ILLNESS
[de-identified] : patient with Crohn's disease who has been on Remicade 10 mg per kilogram every 8 weeks and on that regimen she no hours and any sick abdominal symptoms including no abdominal pain nausea vomiting weight loss diarrhea rectal bleeding. Patient has had some issue with a dyshidrotic dermatitis has been followed by a dermatologist.

## 2020-02-11 NOTE — ASSESSMENT
[FreeTextEntry1] : patient will continue on the Remicade every 8 weeks. She will also get routine annual testing for TB.

## 2020-04-08 ENCOUNTER — APPOINTMENT (OUTPATIENT)
Dept: GASTROENTEROLOGY | Facility: CLINIC | Age: 35
End: 2020-04-08
Payer: COMMERCIAL

## 2020-04-08 PROCEDURE — 99213 OFFICE O/P EST LOW 20 MIN: CPT | Mod: 25

## 2020-04-08 PROCEDURE — 96413 CHEMO IV INFUSION 1 HR: CPT

## 2020-04-08 PROCEDURE — 96415 CHEMO IV INFUSION ADDL HR: CPT

## 2020-04-08 NOTE — ASSESSMENT
[FreeTextEntry1] : A/P\par crohns ileocolitis\par - 10 mg/kg infusion of remicaide q 8 weeks. Tolerating well\par - F/U with Dr Hobbs as scheduled\par - F/U in 8 weeks ago  for remicaide \par - due for Quantaferon gold, Hepatitis B, cbc, cmp , crp , esr

## 2020-04-08 NOTE — HISTORY OF PRESENT ILLNESS
[de-identified] :  Patient with  crohns colitis. Patient had colonoscopy 2014 which showed colitis in descending colon. Unable to intubate the TI. Remote hx of abdominal  Abscess . \par     Currently pt is on Remicaide 10  mg/kg dose. ( 10 vial ).  Gets infusion every 8 weeks. Patient is tolerating infusion well.  NO abdominal pain, no fevers, no diarrhe, no rectal bleeding. Weight is stable

## 2020-04-09 LAB
ALBUMIN SERPL ELPH-MCNC: 4.1 G/DL
ALP BLD-CCNC: 86 U/L
ALT SERPL-CCNC: 21 U/L
ANION GAP SERPL CALC-SCNC: 11 MMOL/L
AST SERPL-CCNC: 17 U/L
BASOPHILS # BLD AUTO: 0.05 K/UL
BASOPHILS NFR BLD AUTO: 0.4 %
BILIRUB SERPL-MCNC: 0.3 MG/DL
BUN SERPL-MCNC: 8 MG/DL
CALCIUM SERPL-MCNC: 9.1 MG/DL
CHLORIDE SERPL-SCNC: 103 MMOL/L
CO2 SERPL-SCNC: 24 MMOL/L
CREAT SERPL-MCNC: 0.71 MG/DL
EOSINOPHIL # BLD AUTO: 0.23 K/UL
EOSINOPHIL NFR BLD AUTO: 2 %
GLUCOSE SERPL-MCNC: 113 MG/DL
HCT VFR BLD CALC: 40.9 %
HGB BLD-MCNC: 13.3 G/DL
IMM GRANULOCYTES NFR BLD AUTO: 0.3 %
LYMPHOCYTES # BLD AUTO: 3.14 K/UL
LYMPHOCYTES NFR BLD AUTO: 26.9 %
MAN DIFF?: NORMAL
MCHC RBC-ENTMCNC: 29.8 PG
MCHC RBC-ENTMCNC: 32.5 GM/DL
MCV RBC AUTO: 91.7 FL
MONOCYTES # BLD AUTO: 0.77 K/UL
MONOCYTES NFR BLD AUTO: 6.6 %
NEUTROPHILS # BLD AUTO: 7.44 K/UL
NEUTROPHILS NFR BLD AUTO: 63.8 %
PLATELET # BLD AUTO: 406 K/UL
POTASSIUM SERPL-SCNC: 4.7 MMOL/L
PROT SERPL-MCNC: 6.9 G/DL
RBC # BLD: 4.46 M/UL
RBC # FLD: 12.5 %
SODIUM SERPL-SCNC: 137 MMOL/L
WBC # FLD AUTO: 11.67 K/UL

## 2020-06-02 ENCOUNTER — APPOINTMENT (OUTPATIENT)
Dept: GASTROENTEROLOGY | Facility: CLINIC | Age: 35
End: 2020-06-02
Payer: COMMERCIAL

## 2020-06-02 PROCEDURE — 96415 CHEMO IV INFUSION ADDL HR: CPT

## 2020-06-02 PROCEDURE — 99213 OFFICE O/P EST LOW 20 MIN: CPT | Mod: 25

## 2020-06-02 PROCEDURE — 96413 CHEMO IV INFUSION 1 HR: CPT

## 2020-06-02 NOTE — ASSESSMENT
[FreeTextEntry1] : At this time, she is tolerating the infusion well.We will continue the Remicade every 8 weeks. She will follow up with Dr. Hobbs.\par \par Hieu Spencer MD\par Gastroenterology \par \par

## 2020-06-02 NOTE — PHYSICAL EXAM
[General Appearance - Alert] : alert [General Appearance - In No Acute Distress] : in no acute distress [Sclera] : the sclera and conjunctiva were normal [PERRL With Normal Accommodation] : pupils were equal in size, round, and reactive to light [Extraocular Movements] : extraocular movements were intact [Outer Ear] : the ears and nose were normal in appearance [Neck Appearance] : the appearance of the neck was normal [Oropharynx] : the oropharynx was normal [Thyroid Diffuse Enlargement] : the thyroid was not enlarged [Neck Cervical Mass (___cm)] : no neck mass was observed [Jugular Venous Distention Increased] : there was no jugular-venous distention [Thyroid Nodule] : there were no palpable thyroid nodules [Auscultation Breath Sounds / Voice Sounds] : lungs were clear to auscultation bilaterally [Heart Rate And Rhythm] : heart rate was normal and rhythm regular [Heart Sounds Gallop] : no gallops [Heart Sounds] : normal S1 and S2 [Murmurs] : no murmurs [Heart Sounds Pericardial Friction Rub] : no pericardial rub [Bowel Sounds] : normal bowel sounds [Abdomen Soft] : soft [Abdomen Tenderness] : non-tender [] : no hepato-splenomegaly [Abdomen Mass (___ Cm)] : no abdominal mass palpated

## 2020-06-02 NOTE — HISTORY OF PRESENT ILLNESS
[de-identified] : Patient arrived for Remicade infusion 10 mg per kilogram every 8 weeks for Crohn's ileocolitis.Currently has no symptoms. She pass a bowel movement on a regular basis.No other GI symptoms.

## 2020-07-28 ENCOUNTER — APPOINTMENT (OUTPATIENT)
Dept: GASTROENTEROLOGY | Facility: CLINIC | Age: 35
End: 2020-07-28
Payer: COMMERCIAL

## 2020-07-28 PROCEDURE — 99213 OFFICE O/P EST LOW 20 MIN: CPT | Mod: 25

## 2020-07-28 PROCEDURE — 96415 CHEMO IV INFUSION ADDL HR: CPT

## 2020-07-28 PROCEDURE — 96413 CHEMO IV INFUSION 1 HR: CPT

## 2020-07-28 NOTE — PHYSICAL EXAM
[General Appearance - Alert] : alert [General Appearance - In No Acute Distress] : in no acute distress [Sclera] : the sclera and conjunctiva were normal [PERRL With Normal Accommodation] : pupils were equal in size, round, and reactive to light [Outer Ear] : the ears and nose were normal in appearance [Extraocular Movements] : extraocular movements were intact [Oropharynx] : the oropharynx was normal [Neck Appearance] : the appearance of the neck was normal [Neck Cervical Mass (___cm)] : no neck mass was observed [Jugular Venous Distention Increased] : there was no jugular-venous distention [Thyroid Diffuse Enlargement] : the thyroid was not enlarged [Thyroid Nodule] : there were no palpable thyroid nodules [Auscultation Breath Sounds / Voice Sounds] : lungs were clear to auscultation bilaterally [Heart Rate And Rhythm] : heart rate was normal and rhythm regular [Heart Sounds] : normal S1 and S2 [Heart Sounds Gallop] : no gallops [Murmurs] : no murmurs [Heart Sounds Pericardial Friction Rub] : no pericardial rub [Full Pulse] : the pedal pulses are present [Bowel Sounds] : normal bowel sounds [Edema] : there was no peripheral edema [Abdomen Soft] : soft [Abdomen Tenderness] : non-tender [Abdomen Mass (___ Cm)] : no abdominal mass palpated [No CVA Tenderness] : no ~M costovertebral angle tenderness [No Spinal Tenderness] : no spinal tenderness [Abnormal Walk] : normal gait [Nail Clubbing] : no clubbing  or cyanosis of the fingernails [Motor Tone] : muscle strength and tone were normal [Musculoskeletal - Swelling] : no joint swelling seen [Skin Color & Pigmentation] : normal skin color and pigmentation [Skin Turgor] : normal skin turgor [] : no rash [No Focal Deficits] : no focal deficits [Oriented To Time, Place, And Person] : oriented to person, place, and time [Impaired Insight] : insight and judgment were intact [Affect] : the affect was normal

## 2020-07-28 NOTE — ASSESSMENT
[FreeTextEntry1] : At this time, she is tolerating the infusion well.We will continue the Remicade every 8 weeks. She will follow up with Dr. Hobbs.\par \par Hieu Spencer MD\par Gastroenterology

## 2020-07-28 NOTE — HISTORY OF PRESENT ILLNESS
[de-identified] : Patient arrived for Remicade infusion 10 mg per kilogram every 8 weeks for Crohn's ileocolitis.Currently has no symptoms. She pass a bowel movement on a regular basis.No other GI symptoms.

## 2020-09-22 ENCOUNTER — APPOINTMENT (OUTPATIENT)
Dept: GASTROENTEROLOGY | Facility: CLINIC | Age: 35
End: 2020-09-22
Payer: COMMERCIAL

## 2020-09-22 PROCEDURE — 99213 OFFICE O/P EST LOW 20 MIN: CPT | Mod: 25

## 2020-09-22 PROCEDURE — 96413 CHEMO IV INFUSION 1 HR: CPT

## 2020-09-22 PROCEDURE — 96415 CHEMO IV INFUSION ADDL HR: CPT

## 2020-09-22 NOTE — HISTORY OF PRESENT ILLNESS
[de-identified] : patient history of Crohn disease now on Remicade at 10 mg per kilogram every 8 weeks.Currently she is completely asymptomatic without abdominal pain diarrhea rectal bleeding nausea vomiting or weight loss. Her extraintestinal manifestations including her nails are improving and she has no other new scalp lesions.

## 2020-09-23 LAB
ALBUMIN SERPL ELPH-MCNC: 4.2 G/DL
ALP BLD-CCNC: 91 U/L
ALT SERPL-CCNC: 13 U/L
ANION GAP SERPL CALC-SCNC: 10 MMOL/L
AST SERPL-CCNC: 13 U/L
BASOPHILS # BLD AUTO: 0.04 K/UL
BASOPHILS NFR BLD AUTO: 0.3 %
BILIRUB SERPL-MCNC: 0.3 MG/DL
BUN SERPL-MCNC: 9 MG/DL
CALCIUM SERPL-MCNC: 9.5 MG/DL
CHLORIDE SERPL-SCNC: 103 MMOL/L
CO2 SERPL-SCNC: 23 MMOL/L
CREAT SERPL-MCNC: 0.82 MG/DL
EOSINOPHIL # BLD AUTO: 0.14 K/UL
EOSINOPHIL NFR BLD AUTO: 1.2 %
GLUCOSE SERPL-MCNC: 99 MG/DL
HBV CORE IGG+IGM SER QL: NONREACTIVE
HBV E AB SER QL: NEGATIVE
HBV E AG SER QL: NEGATIVE
HBV SURFACE AB SER QL: NONREACTIVE
HBV SURFACE AG SER QL: NONREACTIVE
HCT VFR BLD CALC: 42.4 %
HCV AB SER QL: NONREACTIVE
HCV S/CO RATIO: 0.25 S/CO
HGB BLD-MCNC: 13.7 G/DL
IMM GRANULOCYTES NFR BLD AUTO: 0.5 %
LYMPHOCYTES # BLD AUTO: 3.2 K/UL
LYMPHOCYTES NFR BLD AUTO: 27 %
M TB IFN-G BLD-IMP: NEGATIVE
MAN DIFF?: NORMAL
MCHC RBC-ENTMCNC: 29.1 PG
MCHC RBC-ENTMCNC: 32.3 GM/DL
MCV RBC AUTO: 90.2 FL
MONOCYTES # BLD AUTO: 0.81 K/UL
MONOCYTES NFR BLD AUTO: 6.8 %
NEUTROPHILS # BLD AUTO: 7.58 K/UL
NEUTROPHILS NFR BLD AUTO: 64.2 %
PLATELET # BLD AUTO: 385 K/UL
POTASSIUM SERPL-SCNC: 4.6 MMOL/L
PROT SERPL-MCNC: 7.3 G/DL
QUANTIFERON TB PLUS MITOGEN MINUS NIL: 0.9 IU/ML
QUANTIFERON TB PLUS NIL: 0.02 IU/ML
QUANTIFERON TB PLUS TB1 MINUS NIL: 0.01 IU/ML
QUANTIFERON TB PLUS TB2 MINUS NIL: 0.01 IU/ML
RBC # BLD: 4.7 M/UL
RBC # FLD: 12.8 %
SODIUM SERPL-SCNC: 136 MMOL/L
WBC # FLD AUTO: 11.83 K/UL

## 2020-11-17 ENCOUNTER — APPOINTMENT (OUTPATIENT)
Dept: GASTROENTEROLOGY | Facility: CLINIC | Age: 35
End: 2020-11-17
Payer: COMMERCIAL

## 2020-11-17 PROCEDURE — 96413 CHEMO IV INFUSION 1 HR: CPT

## 2020-11-17 PROCEDURE — 99213 OFFICE O/P EST LOW 20 MIN: CPT | Mod: 25

## 2020-11-17 PROCEDURE — 96415 CHEMO IV INFUSION ADDL HR: CPT

## 2020-11-17 NOTE — HISTORY OF PRESENT ILLNESS
[de-identified] : Hx of Crohn's ileocolitis asymptomatic for months on Remicade 1000 mg q8 weeks. Denies diarrhea,abd pain ,bleeding or extraintestinal manifestations of her disease

## 2020-11-17 NOTE — PHYSICAL EXAM
[General Appearance - Alert] : alert [General Appearance - In No Acute Distress] : in no acute distress [Sclera] : the sclera and conjunctiva were normal [PERRL With Normal Accommodation] : pupils were equal in size, round, and reactive to light [Extraocular Movements] : extraocular movements were intact [Auscultation Breath Sounds / Voice Sounds] : lungs were clear to auscultation bilaterally [Heart Rate And Rhythm] : heart rate was normal and rhythm regular [Heart Sounds] : normal S1 and S2 [Heart Sounds Gallop] : no gallops [Murmurs] : no murmurs [Heart Sounds Pericardial Friction Rub] : no pericardial rub [Bowel Sounds] : normal bowel sounds [Abdomen Soft] : soft [Abdomen Tenderness] : non-tender [] : no hepato-splenomegaly [Abdomen Mass (___ Cm)] : no abdominal mass palpated [Oriented To Time, Place, And Person] : oriented to person, place, and time [Impaired Insight] : insight and judgment were intact [Affect] : the affect was normal

## 2020-12-15 PROBLEM — Z01.419 ENCOUNTER FOR GYNECOLOGICAL EXAMINATION WITH PAPANICOLAOU SMEAR OF CERVIX: Status: RESOLVED | Noted: 2017-03-07 | Resolved: 2020-12-15

## 2021-01-12 ENCOUNTER — APPOINTMENT (OUTPATIENT)
Dept: GASTROENTEROLOGY | Facility: CLINIC | Age: 36
End: 2021-01-12
Payer: COMMERCIAL

## 2021-01-12 PROCEDURE — 96413 CHEMO IV INFUSION 1 HR: CPT

## 2021-01-12 PROCEDURE — 96415 CHEMO IV INFUSION ADDL HR: CPT

## 2021-01-12 PROCEDURE — 99072 ADDL SUPL MATRL&STAF TM PHE: CPT

## 2021-01-12 PROCEDURE — 99212 OFFICE O/P EST SF 10 MIN: CPT | Mod: 25

## 2021-01-12 NOTE — HISTORY OF PRESENT ILLNESS
[de-identified] : patient long history of Crohn's ileocolitis on Remicade 10 mg per kilogram every 8 weeks and completely asymptomatic without abdominal pain diarrhea constipation rectal bleeding nausea vomiting weight loss or extra intestinal manifestations of her disease.

## 2021-01-12 NOTE — ASSESSMENT
[FreeTextEntry1] : the patient is doing extremely well on the current dose of Remicade which is 10 mg per kilogram(thousand milligrams (every 8 weeks. She will continue the same regimen and follow up in 8 weeks.

## 2021-01-14 ENCOUNTER — EMERGENCY (EMERGENCY)
Facility: HOSPITAL | Age: 36
LOS: 1 days | Discharge: HOME | End: 2021-01-14
Attending: EMERGENCY MEDICINE
Payer: COMMERCIAL

## 2021-01-14 VITALS
DIASTOLIC BLOOD PRESSURE: 76 MMHG | HEIGHT: 65 IN | SYSTOLIC BLOOD PRESSURE: 115 MMHG | TEMPERATURE: 98 F | HEART RATE: 77 BPM | OXYGEN SATURATION: 98 % | WEIGHT: 199.96 LBS | RESPIRATION RATE: 18 BRPM

## 2021-01-14 LAB
ALBUMIN SERPL ELPH-MCNC: 4.1 G/DL — SIGNIFICANT CHANGE UP (ref 3.3–5.2)
ALP SERPL-CCNC: 97 U/L — SIGNIFICANT CHANGE UP (ref 40–120)
ALT FLD-CCNC: 15 U/L — SIGNIFICANT CHANGE UP
ANION GAP SERPL CALC-SCNC: 10 MMOL/L — SIGNIFICANT CHANGE UP (ref 5–17)
APPEARANCE UR: CLEAR — SIGNIFICANT CHANGE UP
AST SERPL-CCNC: 16 U/L — SIGNIFICANT CHANGE UP
BACTERIA # UR AUTO: NEGATIVE — SIGNIFICANT CHANGE UP
BASOPHILS # BLD AUTO: 0.05 K/UL — SIGNIFICANT CHANGE UP (ref 0–0.2)
BASOPHILS NFR BLD AUTO: 0.3 % — SIGNIFICANT CHANGE UP (ref 0–2)
BILIRUB SERPL-MCNC: 0.2 MG/DL — LOW (ref 0.4–2)
BILIRUB UR-MCNC: NEGATIVE — SIGNIFICANT CHANGE UP
BUN SERPL-MCNC: 12 MG/DL — SIGNIFICANT CHANGE UP (ref 8–20)
CALCIUM SERPL-MCNC: 9.3 MG/DL — SIGNIFICANT CHANGE UP (ref 8.6–10.2)
CHLORIDE SERPL-SCNC: 105 MMOL/L — SIGNIFICANT CHANGE UP (ref 98–107)
CO2 SERPL-SCNC: 23 MMOL/L — SIGNIFICANT CHANGE UP (ref 22–29)
COLOR SPEC: YELLOW — SIGNIFICANT CHANGE UP
CREAT SERPL-MCNC: 0.89 MG/DL — SIGNIFICANT CHANGE UP (ref 0.5–1.3)
DIFF PNL FLD: ABNORMAL
EOSINOPHIL # BLD AUTO: 0 K/UL — SIGNIFICANT CHANGE UP (ref 0–0.5)
EOSINOPHIL NFR BLD AUTO: 0 % — SIGNIFICANT CHANGE UP (ref 0–6)
EPI CELLS # UR: SIGNIFICANT CHANGE UP
GLUCOSE SERPL-MCNC: 119 MG/DL — HIGH (ref 70–99)
GLUCOSE UR QL: NEGATIVE MG/DL — SIGNIFICANT CHANGE UP
HCG SERPL-ACNC: <4 MIU/ML — SIGNIFICANT CHANGE UP
HCT VFR BLD CALC: 40.4 % — SIGNIFICANT CHANGE UP (ref 34.5–45)
HGB BLD-MCNC: 13.5 G/DL — SIGNIFICANT CHANGE UP (ref 11.5–15.5)
IMM GRANULOCYTES NFR BLD AUTO: 0.4 % — SIGNIFICANT CHANGE UP (ref 0–1.5)
KETONES UR-MCNC: ABNORMAL
LACTATE BLDV-MCNC: 0.9 MMOL/L — SIGNIFICANT CHANGE UP (ref 0.5–2)
LEUKOCYTE ESTERASE UR-ACNC: ABNORMAL
LYMPHOCYTES # BLD AUTO: 1.68 K/UL — SIGNIFICANT CHANGE UP (ref 1–3.3)
LYMPHOCYTES # BLD AUTO: 10.2 % — LOW (ref 13–44)
MCHC RBC-ENTMCNC: 29.6 PG — SIGNIFICANT CHANGE UP (ref 27–34)
MCHC RBC-ENTMCNC: 33.4 GM/DL — SIGNIFICANT CHANGE UP (ref 32–36)
MCV RBC AUTO: 88.6 FL — SIGNIFICANT CHANGE UP (ref 80–100)
MONOCYTES # BLD AUTO: 0.63 K/UL — SIGNIFICANT CHANGE UP (ref 0–0.9)
MONOCYTES NFR BLD AUTO: 3.8 % — SIGNIFICANT CHANGE UP (ref 2–14)
NEUTROPHILS # BLD AUTO: 14.09 K/UL — HIGH (ref 1.8–7.4)
NEUTROPHILS NFR BLD AUTO: 85.3 % — HIGH (ref 43–77)
NITRITE UR-MCNC: NEGATIVE — SIGNIFICANT CHANGE UP
PH UR: 9 — HIGH (ref 5–8)
PLATELET # BLD AUTO: 438 K/UL — HIGH (ref 150–400)
POTASSIUM SERPL-MCNC: 4.1 MMOL/L — SIGNIFICANT CHANGE UP (ref 3.5–5.3)
POTASSIUM SERPL-SCNC: 4.1 MMOL/L — SIGNIFICANT CHANGE UP (ref 3.5–5.3)
PROT SERPL-MCNC: 7.7 G/DL — SIGNIFICANT CHANGE UP (ref 6.6–8.7)
PROT UR-MCNC: 100 MG/DL
RBC # BLD: 4.56 M/UL — SIGNIFICANT CHANGE UP (ref 3.8–5.2)
RBC # FLD: 12.6 % — SIGNIFICANT CHANGE UP (ref 10.3–14.5)
RBC CASTS # UR COMP ASSIST: ABNORMAL /HPF (ref 0–4)
SARS-COV-2 RNA SPEC QL NAA+PROBE: SIGNIFICANT CHANGE UP
SODIUM SERPL-SCNC: 138 MMOL/L — SIGNIFICANT CHANGE UP (ref 135–145)
SP GR SPEC: 1.02 — SIGNIFICANT CHANGE UP (ref 1.01–1.02)
UROBILINOGEN FLD QL: NEGATIVE MG/DL — SIGNIFICANT CHANGE UP
WBC # BLD: 16.51 K/UL — HIGH (ref 3.8–10.5)
WBC # FLD AUTO: 16.51 K/UL — HIGH (ref 3.8–10.5)
WBC UR QL: SIGNIFICANT CHANGE UP

## 2021-01-14 PROCEDURE — 84702 CHORIONIC GONADOTROPIN TEST: CPT

## 2021-01-14 PROCEDURE — 74176 CT ABD & PELVIS W/O CONTRAST: CPT

## 2021-01-14 PROCEDURE — 96375 TX/PRO/DX INJ NEW DRUG ADDON: CPT

## 2021-01-14 PROCEDURE — 36415 COLL VENOUS BLD VENIPUNCTURE: CPT

## 2021-01-14 PROCEDURE — U0003: CPT

## 2021-01-14 PROCEDURE — 80053 COMPREHEN METABOLIC PANEL: CPT

## 2021-01-14 PROCEDURE — U0005: CPT

## 2021-01-14 PROCEDURE — 96374 THER/PROPH/DIAG INJ IV PUSH: CPT

## 2021-01-14 PROCEDURE — 74176 CT ABD & PELVIS W/O CONTRAST: CPT | Mod: 26

## 2021-01-14 PROCEDURE — 83605 ASSAY OF LACTIC ACID: CPT

## 2021-01-14 PROCEDURE — 85025 COMPLETE CBC W/AUTO DIFF WBC: CPT

## 2021-01-14 PROCEDURE — 99284 EMERGENCY DEPT VISIT MOD MDM: CPT | Mod: 25

## 2021-01-14 PROCEDURE — 99285 EMERGENCY DEPT VISIT HI MDM: CPT

## 2021-01-14 PROCEDURE — 81001 URINALYSIS AUTO W/SCOPE: CPT

## 2021-01-14 RX ORDER — MORPHINE SULFATE 50 MG/1
6 CAPSULE, EXTENDED RELEASE ORAL ONCE
Refills: 0 | Status: DISCONTINUED | OUTPATIENT
Start: 2021-01-14 | End: 2021-01-14

## 2021-01-14 RX ORDER — ONDANSETRON 8 MG/1
4 TABLET, FILM COATED ORAL ONCE
Refills: 0 | Status: COMPLETED | OUTPATIENT
Start: 2021-01-14 | End: 2021-01-14

## 2021-01-14 RX ORDER — KETOROLAC TROMETHAMINE 30 MG/ML
15 SYRINGE (ML) INJECTION ONCE
Refills: 0 | Status: DISCONTINUED | OUTPATIENT
Start: 2021-01-14 | End: 2021-01-14

## 2021-01-14 RX ORDER — SODIUM CHLORIDE 9 MG/ML
1000 INJECTION INTRAMUSCULAR; INTRAVENOUS; SUBCUTANEOUS ONCE
Refills: 0 | Status: COMPLETED | OUTPATIENT
Start: 2021-01-14 | End: 2021-01-14

## 2021-01-14 RX ORDER — ONDANSETRON 8 MG/1
1 TABLET, FILM COATED ORAL
Qty: 21 | Refills: 0
Start: 2021-01-14 | End: 2021-01-20

## 2021-01-14 RX ORDER — TAMSULOSIN HYDROCHLORIDE 0.4 MG/1
1 CAPSULE ORAL
Qty: 5 | Refills: 0
Start: 2021-01-14 | End: 2021-01-18

## 2021-01-14 RX ORDER — INFLIXIMAB-DYYB 120 MG/ML
0 INJECTION SUBCUTANEOUS
Qty: 0 | Refills: 0 | DISCHARGE

## 2021-01-14 RX ADMIN — SODIUM CHLORIDE 1000 MILLILITER(S): 9 INJECTION INTRAMUSCULAR; INTRAVENOUS; SUBCUTANEOUS at 13:51

## 2021-01-14 RX ADMIN — MORPHINE SULFATE 6 MILLIGRAM(S): 50 CAPSULE, EXTENDED RELEASE ORAL at 17:40

## 2021-01-14 RX ADMIN — ONDANSETRON 4 MILLIGRAM(S): 8 TABLET, FILM COATED ORAL at 13:51

## 2021-01-14 RX ADMIN — Medication 15 MILLIGRAM(S): at 13:51

## 2021-01-14 NOTE — ED PROVIDER NOTE - CARE PROVIDERS DIRECT ADDRESSES
[email or web address]

## 2021-01-14 NOTE — ED PROVIDER NOTE - PATIENT PORTAL LINK FT
You can access the FollowMyHealth Patient Portal offered by [facility] by registering at the following website: [email or web address]. By joining [employer]’s FollowMyHealth portal, you will also be able to view your health information using other applications (apps) compatible with our system.

## 2021-01-14 NOTE — ED PROVIDER NOTE - PROGRESS NOTE DETAILS
NP NOTE: 4mm stone in urinary bladder.  Pain controlled, tolerating po.  Will d/c home with meds, refer urology.

## 2021-01-14 NOTE — ED PROVIDER NOTE - CLINICAL SUMMARY MEDICAL DECISION MAKING FREE TEXT BOX
34 y/o F right flank pain radiating to RLQ, vomiting, frequency and urgency.  Will obtain labs, UA, Ct to r/o kidney stone, medicate and reassess.

## 2021-01-14 NOTE — ED PROVIDER NOTE - CARE PROVIDER_API CALL
Nigel Kemp)  Urology  [address], [address]  [place] [address]  Phone: [phone]  Fax: [phone]  Follow Up Time:

## 2021-01-14 NOTE — ED PROVIDER NOTE - NSFOLLOWUPINSTRUCTIONS_ED_ALL_ED_FT
- you have a kidney stone, take flomax every night until it passes  zofran is for nausea  percocet is for severe pain  call to make an appointment with urology for follow up.  -

## 2021-01-14 NOTE — ED PROVIDER NOTE - OBJECTIVE STATEMENT
36 y/o F presents with c/o right flank pain radiating to RLQ.  Patient states the flank pain started last night then this am had severe increase in pain at 9am, vomiting, frequency and urgency.  No fever or chills.  Took Aleve at 9:15 with no relief of pain.

## 2021-03-09 ENCOUNTER — APPOINTMENT (OUTPATIENT)
Dept: GASTROENTEROLOGY | Facility: CLINIC | Age: 36
End: 2021-03-09
Payer: COMMERCIAL

## 2021-03-09 VITALS — BODY MASS INDEX: 34.66 KG/M2 | WEIGHT: 208 LBS | HEIGHT: 65 IN

## 2021-03-09 PROCEDURE — 96415 CHEMO IV INFUSION ADDL HR: CPT

## 2021-03-09 PROCEDURE — 96413 CHEMO IV INFUSION 1 HR: CPT

## 2021-03-09 PROCEDURE — 99072 ADDL SUPL MATRL&STAF TM PHE: CPT

## 2021-03-09 PROCEDURE — 99213 OFFICE O/P EST LOW 20 MIN: CPT | Mod: 25

## 2021-03-09 NOTE — ASSESSMENT
[FreeTextEntry1] : Patient will have a follow-up infusion in 8 weeks.  She is doing very well in the current dose of 10 mg/kg and is asymptomatic

## 2021-03-09 NOTE — HISTORY OF PRESENT ILLNESS
[de-identified] : Patient with history of Crohn's disease.  She has had surgery in the past and abscesses removed and has been stable now on Remicade 10 mg/kg for quite some time.  She denies abdominal pain diarrhea rectal bleeding joint pains or extraintestinal manifestations of her disease.

## 2021-05-04 ENCOUNTER — APPOINTMENT (OUTPATIENT)
Dept: GASTROENTEROLOGY | Facility: CLINIC | Age: 36
End: 2021-05-04
Payer: COMMERCIAL

## 2021-05-04 PROCEDURE — 96415 CHEMO IV INFUSION ADDL HR: CPT

## 2021-05-04 PROCEDURE — 99072 ADDL SUPL MATRL&STAF TM PHE: CPT

## 2021-05-04 PROCEDURE — 99213 OFFICE O/P EST LOW 20 MIN: CPT | Mod: 25

## 2021-05-04 PROCEDURE — 96413 CHEMO IV INFUSION 1 HR: CPT

## 2021-05-04 NOTE — HISTORY OF PRESENT ILLNESS
[de-identified] : Longstanding history of Crohn's disease.  She is on Remicade 10 mg/kg and on that regimen is asymptomatic.  She has no abdominal pain diarrhea nausea vomiting bleeding weight loss or other manifestations of her disease.

## 2021-05-04 NOTE — ASSESSMENT
[FreeTextEntry1] : The patient is doing well and I discussed with her that she will maintain on the same regimen of Remicade and if 1000 mg every 8 weeks.  The patient and I also also had a long discussion about taking the [place]ovic vaccine related her condition and her reluctance and we discussed this in length.

## 2021-06-22 ENCOUNTER — APPOINTMENT (OUTPATIENT)
Dept: GASTROENTEROLOGY | Facility: CLINIC | Age: 36
End: 2021-06-22
Payer: COMMERCIAL

## 2021-06-22 PROCEDURE — 96413 CHEMO IV INFUSION 1 HR: CPT

## 2021-06-22 PROCEDURE — 99213 OFFICE O/P EST LOW 20 MIN: CPT | Mod: 25

## 2021-06-22 PROCEDURE — 96415 CHEMO IV INFUSION ADDL HR: CPT

## 2021-06-22 PROCEDURE — 99072 ADDL SUPL MATRL&STAF TM PHE: CPT

## 2021-06-22 NOTE — ASSESSMENT
[FreeTextEntry1] : Patient is doing well and continue on her dose of Remicade at 10 mg/kg and follow-up in 8 weeks or as needed

## 2021-08-17 ENCOUNTER — APPOINTMENT (OUTPATIENT)
Dept: GASTROENTEROLOGY | Facility: CLINIC | Age: 36
End: 2021-08-17
Payer: COMMERCIAL

## 2021-08-17 PROCEDURE — 96413 CHEMO IV INFUSION 1 HR: CPT

## 2021-08-17 PROCEDURE — 99213 OFFICE O/P EST LOW 20 MIN: CPT | Mod: 25

## 2021-08-17 PROCEDURE — 96415 CHEMO IV INFUSION ADDL HR: CPT

## 2021-08-17 NOTE — ASSESSMENT
[FreeTextEntry1] : Crohn's ileocolitis.  Under good control with current biologic therapy.  Tolerated today's infusion of Remicade well.\par Plan repeat infusion same dose 1000 mg in 8 weeks.\par Plan repeat blood work prior to next infusion to include CBC CMP hepatitis B surface antigen and QuantiFERON gold.

## 2021-08-17 NOTE — HISTORY OF PRESENT ILLNESS
[FreeTextEntry1] : 36-year-old white female with history of Crohn's ileocolitis and prior history of an intra-abdominal abscess now resolved.  Patient is chronically maintained on Remicade infusions.  Last infusion was 6/22/2021.  Infusions are with Remicade at 10 mg/kg every 8 weeks.  She receives 10 vials at a time, i.e. 1000 mg.\par Patient reports that bowel movements are regular formed daily without urgency cramps bleeding or diarrhea.  Feeling well.  No complaints.  Last blood work was done 9/20.  She is due for blood work shortly.\par She was premedicated with Tylenol and Zyrtec.  Patient was then infused with 10 vials, 1000 mg of Remicade over 2.5 hours uneventfully.  No infusion reaction.  No hypotension.  Tolerated well.

## 2021-10-13 ENCOUNTER — APPOINTMENT (OUTPATIENT)
Dept: GASTROENTEROLOGY | Facility: CLINIC | Age: 36
End: 2021-10-13
Payer: COMMERCIAL

## 2021-10-13 PROCEDURE — 96413 CHEMO IV INFUSION 1 HR: CPT

## 2021-10-13 PROCEDURE — 99213 OFFICE O/P EST LOW 20 MIN: CPT | Mod: 25

## 2021-10-13 PROCEDURE — 96415 CHEMO IV INFUSION ADDL HR: CPT

## 2021-10-13 NOTE — ASSESSMENT
[FreeTextEntry1] : Pt is a 37 y/o female with PMH Crohns disease, doing well on Remicade 10mg/kg (1000mg) q8w. \par \par She was given rx for labs at previous visit but has not completed it - plans to do it this week - CBC, CMP, Hep B sAg and Quant Gold. \par \par RTC in 8 weeks for infusion

## 2021-10-13 NOTE — HISTORY OF PRESENT ILLNESS
[de-identified] : Patient is a 36 year female, with PMH of Crohns' disease and h/o intra-abdominal abscess, who presents for Remicade infusion. Patient receives 10mg/kg (1000mg) q8w. She is feeling well, BMs are soft, formed and regular. She denies abdominal pain, rectal bleeding or loose BMs. She is not having breakthrough symptoms between infusions. \par \par Patient denies pyrosis, dysphagia, abdominal pain, change in BMs, rectal bleeding, nausea, vomiting, or unexplained weight loss.\par

## 2021-11-07 LAB
ALBUMIN SERPL ELPH-MCNC: 4.1 G/DL
ALP BLD-CCNC: 83 U/L
ALT SERPL-CCNC: 13 U/L
ANION GAP SERPL CALC-SCNC: 14 MMOL/L
AST SERPL-CCNC: 11 U/L
BASOPHILS # BLD AUTO: 0.05 K/UL
BASOPHILS NFR BLD AUTO: 0.4 %
BILIRUB SERPL-MCNC: 0.2 MG/DL
BUN SERPL-MCNC: 9 MG/DL
CALCIUM SERPL-MCNC: 8.9 MG/DL
CHLORIDE SERPL-SCNC: 104 MMOL/L
CO2 SERPL-SCNC: 21 MMOL/L
CREAT SERPL-MCNC: 0.81 MG/DL
EOSINOPHIL # BLD AUTO: 0.12 K/UL
EOSINOPHIL NFR BLD AUTO: 0.9 %
GLUCOSE SERPL-MCNC: 84 MG/DL
HBV SURFACE AG SER QL: NONREACTIVE
HCT VFR BLD CALC: 41.3 %
HGB BLD-MCNC: 13.7 G/DL
IMM GRANULOCYTES NFR BLD AUTO: 0.4 %
LYMPHOCYTES # BLD AUTO: 3.63 K/UL
LYMPHOCYTES NFR BLD AUTO: 27.3 %
MAN DIFF?: NORMAL
MCHC RBC-ENTMCNC: 30.6 PG
MCHC RBC-ENTMCNC: 33.2 GM/DL
MCV RBC AUTO: 92.4 FL
MONOCYTES # BLD AUTO: 0.91 K/UL
MONOCYTES NFR BLD AUTO: 6.8 %
NEUTROPHILS # BLD AUTO: 8.56 K/UL
NEUTROPHILS NFR BLD AUTO: 64.2 %
PLATELET # BLD AUTO: 411 K/UL
POTASSIUM SERPL-SCNC: 4.5 MMOL/L
PROT SERPL-MCNC: 7.1 G/DL
RBC # BLD: 4.47 M/UL
RBC # FLD: 13.2 %
SODIUM SERPL-SCNC: 139 MMOL/L
WBC # FLD AUTO: 13.32 K/UL

## 2021-11-08 LAB
M TB IFN-G BLD-IMP: NEGATIVE
QUANTIFERON TB PLUS MITOGEN MINUS NIL: 4.62 IU/ML
QUANTIFERON TB PLUS NIL: 0.02 IU/ML
QUANTIFERON TB PLUS TB1 MINUS NIL: -0.01 IU/ML
QUANTIFERON TB PLUS TB2 MINUS NIL: 0 IU/ML

## 2021-12-08 ENCOUNTER — APPOINTMENT (OUTPATIENT)
Dept: GASTROENTEROLOGY | Facility: CLINIC | Age: 36
End: 2021-12-08
Payer: COMMERCIAL

## 2021-12-08 PROCEDURE — 96413 CHEMO IV INFUSION 1 HR: CPT

## 2021-12-08 PROCEDURE — 99213 OFFICE O/P EST LOW 20 MIN: CPT | Mod: 25

## 2021-12-08 PROCEDURE — 96415 CHEMO IV INFUSION ADDL HR: CPT

## 2021-12-08 NOTE — HISTORY OF PRESENT ILLNESS
[FreeTextEntry1] : 36-year-old white female with history of Crohn's ileocolitis and intra-abdominal abscess, currently resolved.  Patient has been maintained on Remicade infusions for multiple years now.  Last infused in mid October and is now receiving 10 mg/kg every 8 weeks.  1000 mg/inf. currently.  Recent blood work was reviewed with patient.  White count elevated at 13,000 platelet count elevated 411 hemoglobin normal CMP was normal.  QuantiFERON gold was negative.  Hepatitis B surface antigen was negative.\par Denies having any significant diarrhea.  No rectal bleeding.  No abdominal pain.  Bowel movements are normal.  No fever or weight loss.\par Patient was premedicated with Tylenol and Zyrtec.  \par Over the next 2.5 hours she was uneventfully infused with 1000 mg of Remicade.  No hypotension.  No allergic reaction.  No rash.  Tolerated infusion well.

## 2021-12-08 NOTE — ASSESSMENT
[FreeTextEntry1] : Crohn's ileocolitis seemingly in clinical remission on current Remicade infusions.  No other active medications.  All recent blood work is acceptable including TB and hepatitis B analysis.\par Successful, uncomplicated infusion of Remicade at 10 mg/kg every 8 weeks.  Patient received 1000 mg of Remicade today uneventfully.\par \par Plan return in 8 weeks for next infusion.  Same dose.

## 2022-01-09 RX ORDER — CLOBETASOL PROPIONATE 0.5 MG/G
0.05 OINTMENT TOPICAL
Qty: 60 | Refills: 0 | Status: ACTIVE | COMMUNITY
Start: 2021-09-23

## 2022-01-10 ENCOUNTER — NON-APPOINTMENT (OUTPATIENT)
Age: 37
End: 2022-01-10

## 2022-01-10 ENCOUNTER — APPOINTMENT (OUTPATIENT)
Dept: SURGICAL ONCOLOGY | Facility: CLINIC | Age: 37
End: 2022-01-10
Payer: COMMERCIAL

## 2022-01-10 VITALS
BODY MASS INDEX: 34.66 KG/M2 | TEMPERATURE: 97.8 F | HEIGHT: 65 IN | HEART RATE: 80 BPM | SYSTOLIC BLOOD PRESSURE: 124 MMHG | OXYGEN SATURATION: 97 % | DIASTOLIC BLOOD PRESSURE: 83 MMHG | WEIGHT: 208 LBS | RESPIRATION RATE: 15 BRPM

## 2022-01-10 PROCEDURE — 99244 OFF/OP CNSLTJ NEW/EST MOD 40: CPT

## 2022-01-10 NOTE — REASON FOR VISIT
[Initial Consultation] : an initial consultation for [Other: _____] : [unfilled] [FreeTextEntry2] : Atypical lymphoid hyperplasia scalp biopsy

## 2022-01-10 NOTE — REVIEW OF SYSTEMS
[Negative] : Endocrine [FreeTextEntry7] : Immunosuppressive therapy [de-identified] : Cutaneous lymphocytic infiltrate [FreeTextEntry1] : Cutaneous lymphocytic infiltrate

## 2022-01-10 NOTE — HISTORY OF PRESENT ILLNESS
[de-identified] : 36-year-old lady referred by plastic surgery (Dr. Julio JONES @ [facility]) after the recent diagnosis of dermal lymphoid proliferation, suggestive of a B-cell lymphoma.\par \par She has had several (at least 5) biopsies from her scalp dating back to 2015, all with similar histologies.\par \par + Immunosuppressive therapy for her Crohn's disease, first with Humira, now with Remicade (below)\par \par No prior personal history of malignancy.\par \par No "B symptoms".\par \par \par No family history of lymphoproliferative disease.\par \par Family history of malignancy:\par Both grandmothers had lung cancer.\par Her mother had skin cancer.\par \par \par She does not have an internist\par \par \par +IMMUNOTHERAPY for CROHN'S.\par Currently receiving Remicade.\par GI: Dr. Wilder HARTLEY\par \par January 2021:\par Hospitalized at [facility] with nephrolithiasis.\par \par \par She does not have an internist.\par \par \par GYN: Dr. Reji ELIAS.\par Not seen since ~2017\par \par Menarche at 13.\par \par \par She has never had a mammogram.\par \par 2019 colonoscopy identified no worrisome findings.\par + Personal history of Crohn's

## 2022-01-10 NOTE — ASSESSMENT
[FreeTextEntry1] : I explained the option of an excisional biopsy of a suspicious scalp lesion, and the ambulatory surgery setting where the tissue could be submitted fresh to pathology for further evaluation.\par \par They understand and agree.\par \par Paperwork for surgical scheduling submitted.\par \par Reviewed in detail, all questions answered.\par \par Note dictated to the referring physician.

## 2022-02-02 ENCOUNTER — APPOINTMENT (OUTPATIENT)
Dept: GASTROENTEROLOGY | Facility: CLINIC | Age: 37
End: 2022-02-02
Payer: COMMERCIAL

## 2022-02-02 PROCEDURE — 99213 OFFICE O/P EST LOW 20 MIN: CPT | Mod: 25

## 2022-02-02 PROCEDURE — 96415 CHEMO IV INFUSION ADDL HR: CPT

## 2022-02-02 PROCEDURE — 96413 CHEMO IV INFUSION 1 HR: CPT

## 2022-02-02 NOTE — ASSESSMENT
[FreeTextEntry1] : At this time she is doing well and we will simply continue with the same medical regimen.

## 2022-02-02 NOTE — PHYSICAL EXAM
[General Appearance - Alert] : alert [General Appearance - In No Acute Distress] : in no acute distress [General Appearance - Well Nourished] : well nourished [General Appearance - Well Developed] : well developed [General Appearance - Well-Appearing] : healthy appearing [Sclera] : the sclera and conjunctiva were normal [PERRL With Normal Accommodation] : pupils were equal in size, round, and reactive to light [Extraocular Movements] : extraocular movements were intact [Outer Ear] : the ears and nose were normal in appearance [Hearing Threshold Finger Rub Not [place]] : hearing was normal [Examination Of The Oral Cavity] : the lips and gums were normal [Neck Appearance] : the appearance of the neck was normal [Heart Rate And Rhythm] : heart rate was normal and rhythm regular [Bowel Sounds] : normal bowel sounds [Abdomen Soft] : soft [Abdomen Tenderness] : non-tender [Abdomen Mass (___ Cm)] : no abdominal mass palpated [Abnormal Walk] : normal gait [Nail Clubbing] : no clubbing  or cyanosis of the fingernails [Musculoskeletal - Swelling] : no joint swelling seen [Skin Color & Pigmentation] : normal skin color and pigmentation [Skin Turgor] : normal skin turgor [] : no rash [Motor Exam] : the motor exam was normal [No Focal Deficits] : no focal deficits [Oriented To Time, Place, And Person] : oriented to person, place, and time [Impaired Insight] : insight and judgment were intact [Affect] : the affect was normal

## 2022-02-02 NOTE — HISTORY OF PRESENT ILLNESS
[de-identified] : The patient has Crohn's ileocolitis and is receiving Remicade at a dose of 10 mg/kg every 8 weeks which she tolerates without difficulty.  At this time she is asymptomatic without any abdominal pain, nausea, vomiting, diarrhea or constipation.

## 2022-02-10 ENCOUNTER — APPOINTMENT (OUTPATIENT)
Dept: PLASTIC SURGERY | Facility: CLINIC | Age: 37
End: 2022-02-10

## 2022-02-15 ENCOUNTER — APPOINTMENT (OUTPATIENT)
Dept: PLASTIC SURGERY | Facility: HOSPITAL | Age: 37
End: 2022-02-15

## 2022-02-15 ENCOUNTER — APPOINTMENT (OUTPATIENT)
Dept: SURGICAL ONCOLOGY | Facility: HOSPITAL | Age: 37
End: 2022-02-15

## 2022-03-29 ENCOUNTER — NON-APPOINTMENT (OUTPATIENT)
Age: 37
End: 2022-03-29

## 2022-03-30 ENCOUNTER — APPOINTMENT (OUTPATIENT)
Dept: GASTROENTEROLOGY | Facility: CLINIC | Age: 37
End: 2022-03-30
Payer: COMMERCIAL

## 2022-03-30 ENCOUNTER — NON-APPOINTMENT (OUTPATIENT)
Age: 37
End: 2022-03-30

## 2022-03-30 ENCOUNTER — MED ADMIN CHARGE (OUTPATIENT)
Age: 37
End: 2022-03-30

## 2022-03-30 VITALS — HEART RATE: 78 BPM | RESPIRATION RATE: 18 BRPM | SYSTOLIC BLOOD PRESSURE: 138 MMHG | DIASTOLIC BLOOD PRESSURE: 86 MMHG

## 2022-03-30 VITALS — RESPIRATION RATE: 16 BRPM | DIASTOLIC BLOOD PRESSURE: 80 MMHG | SYSTOLIC BLOOD PRESSURE: 122 MMHG | HEART RATE: 71 BPM

## 2022-03-30 DIAGNOSIS — L98.6 OTHER INFILTRATIVE DISORDERS OF THE SKIN AND SUBCUTANEOUS TISSUE: ICD-10-CM

## 2022-03-30 PROCEDURE — 96415 CHEMO IV INFUSION ADDL HR: CPT

## 2022-03-30 PROCEDURE — 96413 CHEMO IV INFUSION 1 HR: CPT

## 2022-03-30 PROCEDURE — 99213 OFFICE O/P EST LOW 20 MIN: CPT | Mod: 25

## 2022-03-30 RX ORDER — INFLIXIMAB 100 MG/10ML
100 INJECTION, POWDER, LYOPHILIZED, FOR SOLUTION INTRAVENOUS
Qty: 1 | Refills: 0 | Status: COMPLETED | OUTPATIENT
Start: 2022-03-30

## 2022-03-30 RX ORDER — CETIRIZINE HYDROCHLORIDE 10 MG/1
10 TABLET, FILM COATED ORAL
Qty: 0 | Refills: 0 | Status: COMPLETED | OUTPATIENT
Start: 2022-03-30

## 2022-03-30 RX ORDER — ACETAMINOPHEN 500 MG/1
500 TABLET, COATED ORAL
Qty: 0 | Refills: 0 | Status: COMPLETED | OUTPATIENT
Start: 2022-03-30

## 2022-03-30 RX ADMIN — ACETAMINOPHEN 2 MG: 500 TABLET, FILM COATED ORAL at 00:00

## 2022-03-30 RX ADMIN — CETIRIZINE HYDROCHLORIDE 1 MG: 10 TABLET, FILM COATED ORAL at 00:00

## 2022-03-30 RX ADMIN — INFLIXIMAB 0 MG: 100 INJECTION, POWDER, LYOPHILIZED, FOR SOLUTION INTRAVENOUS at 00:00

## 2022-04-03 PROBLEM — L98.6 ATYPICAL LYMPHOCYTIC INFILTRATE OF SKIN: Status: ACTIVE | Noted: 2022-01-10

## 2022-04-03 NOTE — ASSESSMENT
[FreeTextEntry1] : Crohn's disease.  Ileocolitis.  Clinically under good control with Remicade infusions.  She is taking no other medications currently for the Crohn's disease.\par Successful infusion of Remicade 1000 mg i.e. 10 mg/kg.\par Plan repeat infusion of Remicade in 8 weeks.  Same dose.\par \par Known to have B-cell lymphoma in the scalp biopsy done 11/21.

## 2022-04-03 NOTE — HISTORY OF PRESENT ILLNESS
[FreeTextEntry1] : 36-year-old white female with history of Crohn's disease ileocolitis complicated by intra-abdominal abscess which is since been treated and resolved.  Formally treated with Humira and more recently with Remicade.  Currently receiving 10 mg/kg every 8 weeks.  She was last infused on 2/2/2022.\par She has had a scalp biopsy in November by Dr. Strickland with finding of localized B-cell lymphoma.\par No recent change in clinical status.  Denies diarrhea urgency bleeding cramps pain weight loss or distention.  No anorexia or nausea.\par Last blood work from November showed normal CBC and CMP.  Chronic feet are not cold was negative and hepatitis B surface antigen was negative.\par \par Patient was premedicated with Tylenol and Zyrtec.\par Patient was infused with 1000 mg of Remicade over 2.5 hours.  Infusion was uncomplicated.  No hypotension.  No rash.  No allergic reaction.

## 2022-05-03 NOTE — HISTORY OF PRESENT ILLNESS
Procedure(s):  LAPAROSCOPIC LOW ANTERIOR RESECTION WITH COLOSTOMY. general, spinal    Anesthesia Post Evaluation      Multimodal analgesia: multimodal analgesia used between 6 hours prior to anesthesia start to PACU discharge  Patient location during evaluation: PACU  Patient participation: complete - patient participated  Level of consciousness: sleepy but conscious  Pain management: adequate  Airway patency: patent  Anesthetic complications: no  Cardiovascular status: acceptable and stable  Respiratory status: acceptable and unassisted  Hydration status: acceptable  Comments: The patient was seen and evaluated in the post-operative period. The time of my evaluation may not match the time of this note. The patient was extubated in the PACU without complications. The patient denied uncontrolled pain or nausea, and there were no significant complications evident. Jenifer Gonzalez MD      Post anesthesia nausea and vomiting:  none  Final Post Anesthesia Temperature Assessment:  Normothermia (36.0-37.5 degrees C)      INITIAL Post-op Vital signs:   Vitals Value Taken Time   BP 86/64 05/03/22 1915   Temp 36.2 °C (97.2 °F) 05/03/22 1819   Pulse 96 05/03/22 1918   Resp 21 05/03/22 1918   SpO2 100 % 05/03/22 1918   Vitals shown include unvalidated device data. [de-identified] : Patient with longstanding history of Crohn's disease.  She has been on Remicade at 10 mg/kg every 8 weeks and doing extremely well.  She is asymptomatic without abdominal pain diarrhea rectal bleeding joint pains rashes fevers or any other symptoms and her weight is stable.

## 2022-05-25 ENCOUNTER — APPOINTMENT (OUTPATIENT)
Dept: GASTROENTEROLOGY | Facility: CLINIC | Age: 37
End: 2022-05-25
Payer: COMMERCIAL

## 2022-05-25 VITALS
TEMPERATURE: 98 F | RESPIRATION RATE: 16 BRPM | DIASTOLIC BLOOD PRESSURE: 80 MMHG | HEART RATE: 82 BPM | SYSTOLIC BLOOD PRESSURE: 122 MMHG

## 2022-05-25 VITALS — SYSTOLIC BLOOD PRESSURE: 118 MMHG | HEART RATE: 79 BPM | DIASTOLIC BLOOD PRESSURE: 74 MMHG | RESPIRATION RATE: 16 BRPM

## 2022-05-25 PROCEDURE — 96415 CHEMO IV INFUSION ADDL HR: CPT

## 2022-05-25 PROCEDURE — 96413 CHEMO IV INFUSION 1 HR: CPT

## 2022-05-25 PROCEDURE — 99213 OFFICE O/P EST LOW 20 MIN: CPT | Mod: 25

## 2022-05-25 RX ORDER — ACETAMINOPHEN 500 MG/1
500 TABLET, COATED ORAL
Qty: 0 | Refills: 0 | Status: COMPLETED | OUTPATIENT
Start: 2022-05-25

## 2022-05-25 RX ORDER — INFLIXIMAB 100 MG/10ML
100 INJECTION, POWDER, LYOPHILIZED, FOR SOLUTION INTRAVENOUS
Qty: 1 | Refills: 0 | Status: COMPLETED | OUTPATIENT
Start: 2022-05-25

## 2022-05-25 RX ADMIN — INFLIXIMAB 10 MG: 100 INJECTION, POWDER, LYOPHILIZED, FOR SOLUTION INTRAVENOUS at 00:00

## 2022-05-25 RX ADMIN — ACETAMINOPHEN 2 MG: 500 TABLET, FILM COATED ORAL at 00:00

## 2022-05-25 NOTE — HISTORY OF PRESENT ILLNESS
[de-identified] : The patient is here for her every 8-week infusion of Remicade at a dose of 10 mg/kg for her underlying Crohn's ileocolitis.  She is feeling well with no complaints whatsoever and has tolerated the infusion.

## 2022-07-20 ENCOUNTER — APPOINTMENT (OUTPATIENT)
Dept: GASTROENTEROLOGY | Facility: CLINIC | Age: 37
End: 2022-07-20

## 2022-07-27 ENCOUNTER — NON-APPOINTMENT (OUTPATIENT)
Age: 37
End: 2022-07-27

## 2022-08-04 ENCOUNTER — APPOINTMENT (OUTPATIENT)
Dept: GASTROENTEROLOGY | Facility: CLINIC | Age: 37
End: 2022-08-04

## 2022-08-04 VITALS
HEART RATE: 82 BPM | TEMPERATURE: 97.9 F | DIASTOLIC BLOOD PRESSURE: 78 MMHG | SYSTOLIC BLOOD PRESSURE: 122 MMHG | RESPIRATION RATE: 16 BRPM

## 2022-08-04 VITALS — HEART RATE: 71 BPM | SYSTOLIC BLOOD PRESSURE: 118 MMHG | DIASTOLIC BLOOD PRESSURE: 80 MMHG | RESPIRATION RATE: 16 BRPM

## 2022-08-04 PROCEDURE — 96413 CHEMO IV INFUSION 1 HR: CPT

## 2022-08-04 PROCEDURE — J3590N: CUSTOM | Mod: NC

## 2022-08-04 PROCEDURE — 99213 OFFICE O/P EST LOW 20 MIN: CPT | Mod: 25

## 2022-08-04 NOTE — ASSESSMENT
[FreeTextEntry1] : Patient will be getting her infusion today 600 mg and get follow-up infusion in 4 and 8 weeks and then go on home injective therapy after that.  She will follow-up in the office in 3 months.  If she does well we will taper off the budesonide.

## 2022-08-04 NOTE — HISTORY OF PRESENT ILLNESS
[de-identified] : Patient with long history of Crohn's disease with been stable on every 8 weeks Remicade.  Patient developed lesions in her scalp on her skin which may be increased precursors to B-cell lymphoma.  After discussion with her oncologist we felt we should stop the Remicade and switch to Skyrizi which has no risk of lymphoma or other neoplasm.  Patient did develop symptoms in between waiting the 3 weeks so we could get the Skyrizi and was put on budesonide which is helped her significantly but still mildly symptomatic.  She has little bit of abdominal discomfort.  She is getting her first infusion of 600 mg IV today and will get a second and third induction infusions in 4 weeks and 8 weeks followed by home injective therapy.

## 2022-08-05 ENCOUNTER — NON-APPOINTMENT (OUTPATIENT)
Age: 37
End: 2022-08-05

## 2022-08-10 NOTE — ED PROVIDER NOTE - NEUROLOGICAL, MLM
Called and left voicemail. Patient needs to set up 3 month virtual follow up with Dr. Monk. Ok to use XI slot.   Alert and oriented, no focal deficits, no motor or sensory deficits.

## 2022-08-31 ENCOUNTER — APPOINTMENT (OUTPATIENT)
Dept: GASTROENTEROLOGY | Facility: CLINIC | Age: 37
End: 2022-08-31

## 2022-09-09 ENCOUNTER — APPOINTMENT (OUTPATIENT)
Dept: GASTROENTEROLOGY | Facility: CLINIC | Age: 37
End: 2022-09-09

## 2022-09-09 VITALS
TEMPERATURE: 98.1 F | RESPIRATION RATE: 16 BRPM | HEART RATE: 81 BPM | SYSTOLIC BLOOD PRESSURE: 116 MMHG | DIASTOLIC BLOOD PRESSURE: 76 MMHG

## 2022-09-09 VITALS — SYSTOLIC BLOOD PRESSURE: 116 MMHG | DIASTOLIC BLOOD PRESSURE: 80 MMHG | RESPIRATION RATE: 16 BRPM | HEART RATE: 78 BPM

## 2022-09-09 PROCEDURE — 96413 CHEMO IV INFUSION 1 HR: CPT

## 2022-09-09 PROCEDURE — J3590N: CUSTOM | Mod: NC

## 2022-09-09 PROCEDURE — 99213 OFFICE O/P EST LOW 20 MIN: CPT | Mod: 25

## 2022-09-09 RX ADMIN — RISANKIZUMAB-RZAA 600 MG/10ML: 60 INJECTION INTRAVENOUS at 00:00

## 2022-09-09 NOTE — HISTORY OF PRESENT ILLNESS
[de-identified] : Patient with Crohn's ileocolitis who was recently taken off of Remicade due to a development of lesions on her scalp which were thought to possibly be precursors to B-cell lymphoma.  She is here for her second of 3 induction doses of Skyrizi which she has tolerated without any difficulty and is feeling well without complaints.

## 2022-09-09 NOTE — PHYSICAL EXAM
[General Appearance - Alert] : alert [General Appearance - In No Acute Distress] : in no acute distress [General Appearance - Well Nourished] : well nourished [General Appearance - Well Developed] : well developed [General Appearance - Well-Appearing] : healthy appearing [Sclera] : the sclera and conjunctiva were normal [PERRL With Normal Accommodation] : pupils were equal in size, round, and reactive to light [Extraocular Movements] : extraocular movements were intact [Outer Ear] : the ears and nose were normal in appearance [Hearing Threshold Finger Rub Not [place]] : hearing was normal [Examination Of The Oral Cavity] : the lips and gums were normal [Neck Appearance] : the appearance of the neck was normal [Heart Rate And Rhythm] : heart rate was normal and rhythm regular [Bowel Sounds] : normal bowel sounds [Abdomen Soft] : soft [Abdomen Tenderness] : non-tender [Abdomen Mass (___ Cm)] : no abdominal mass palpated [Abnormal Walk] : normal gait [Nail Clubbing] : no clubbing  or cyanosis of the fingernails [Musculoskeletal - Swelling] : no joint swelling seen [Skin Color & Pigmentation] : normal skin color and pigmentation [Skin Turgor] : normal skin turgor [] : no rash [Motor Exam] : the motor exam was normal [No Focal Deficits] : no focal deficits [Oriented To Time, Place, And Person] : oriented to person, place, and time [Impaired Insight] : insight and judgment were intact [Affect] : the affect was normal

## 2022-09-09 NOTE — ASSESSMENT
[FreeTextEntry1] : At this time she is doing well we will continue with the same medical regimen which will consist of a third induction dose of Skyrizi after which she will transition to home injection therapy.

## 2022-10-07 ENCOUNTER — APPOINTMENT (OUTPATIENT)
Dept: GASTROENTEROLOGY | Facility: CLINIC | Age: 37
End: 2022-10-07

## 2022-10-07 VITALS
SYSTOLIC BLOOD PRESSURE: 120 MMHG | DIASTOLIC BLOOD PRESSURE: 80 MMHG | HEART RATE: 80 BPM | WEIGHT: 208 LBS | RESPIRATION RATE: 12 BRPM | TEMPERATURE: 98 F | HEIGHT: 65 IN | BODY MASS INDEX: 34.66 KG/M2

## 2022-10-07 VITALS — SYSTOLIC BLOOD PRESSURE: 116 MMHG | HEART RATE: 84 BPM | DIASTOLIC BLOOD PRESSURE: 72 MMHG | RESPIRATION RATE: 16 BRPM

## 2022-10-07 VITALS
DIASTOLIC BLOOD PRESSURE: 74 MMHG | RESPIRATION RATE: 16 BRPM | SYSTOLIC BLOOD PRESSURE: 118 MMHG | HEART RATE: 74 BPM | TEMPERATURE: 97.9 F

## 2022-10-07 PROCEDURE — 96413 CHEMO IV INFUSION 1 HR: CPT

## 2022-10-07 PROCEDURE — 99213 OFFICE O/P EST LOW 20 MIN: CPT | Mod: 25

## 2022-10-07 PROCEDURE — J3590N: CUSTOM | Mod: NC

## 2022-10-07 RX ADMIN — RISANKIZUMAB-RZAA 600 MG/10ML: 60 INJECTION INTRAVENOUS at 00:00

## 2022-10-07 NOTE — HISTORY OF PRESENT ILLNESS
[FreeTextEntry1] : Patient presents for her thyroglobulin dose of Skyrisi for Crohn's ileocolitis.  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

## 2022-10-07 NOTE — ASSESSMENT
[FreeTextEntry1] : Patient has completed her third loading dose of Skyrisi today followed by subcutaneous injection in 4 weeks and then every 8 weeks thereafter.\par \par .

## 2022-10-07 NOTE — PHYSICAL EXAM
[Alert] : alert [Normal Voice/Communication] : normal voice/communication [Healthy Appearing] : healthy appearing [No Acute Distress] : no acute distress [Sclera] : the sclera and conjunctiva were normal [Hearing Threshold Finger Rub Not [place]] : hearing was normal [Normal Lips/Gums] : the lips and gums were normal [Oropharynx] : the oropharynx was normal [Normal Appearance] : the appearance of the neck was normal [No Neck Mass] : no neck mass was observed [No Respiratory Distress] : no respiratory distress [No Acc Muscle Use] : no accessory muscle use [Auscultation Breath Sounds / Voice Sounds] : lungs were clear to auscultation bilaterally [Respiration, Rhythm And Depth] : normal respiratory rhythm and effort [Heart Rate And Rhythm] : heart rate was normal and rhythm regular [Normal S1, S2] : normal S1 and S2 [Murmurs] : no murmurs [Bowel Sounds] : normal bowel sounds [Abdomen Tenderness] : non-tender [No Masses] : no abdominal mass palpated [Abdomen Soft] : soft [] : no hepatosplenomegaly [Oriented To Time, Place, And Person] : oriented to person, place, and time

## 2022-10-11 ENCOUNTER — NON-APPOINTMENT (OUTPATIENT)
Age: 37
End: 2022-10-11

## 2023-03-07 ENCOUNTER — TRANSCRIPTION ENCOUNTER (OUTPATIENT)
Age: 38
End: 2023-03-07

## 2023-04-17 ENCOUNTER — APPOINTMENT (OUTPATIENT)
Dept: GASTROENTEROLOGY | Facility: CLINIC | Age: 38
End: 2023-04-17
Payer: COMMERCIAL

## 2023-04-17 VITALS
SYSTOLIC BLOOD PRESSURE: 110 MMHG | TEMPERATURE: 98.3 F | WEIGHT: 228 LBS | DIASTOLIC BLOOD PRESSURE: 76 MMHG | OXYGEN SATURATION: 97 % | HEIGHT: 65 IN | RESPIRATION RATE: 16 BRPM | HEART RATE: 98 BPM | BODY MASS INDEX: 37.99 KG/M2

## 2023-04-17 PROCEDURE — 99215 OFFICE O/P EST HI 40 MIN: CPT

## 2023-04-17 RX ORDER — INFLIXIMAB 100 MG/10ML
100 INJECTION, POWDER, LYOPHILIZED, FOR SOLUTION INTRAVENOUS
Qty: 10 | Refills: 6 | Status: DISCONTINUED | COMMUNITY
End: 2023-04-17

## 2023-04-17 NOTE — PHYSICAL EXAM
[Normal] : normal bowel sounds, non-tender, no masses, soft, no no hepato-splenomegaly [de-identified] : Slight body odor [de-identified] : Focal tenderness to the right of the umbilicus and 2 fingerbreadths down.

## 2023-04-17 NOTE — ASSESSMENT
[FreeTextEntry1] : 36 yo F history of Crohn's diagnosed approximately 8 years ago, patient of Dr. Hobbs's, multiple abscesses, doing well on Skyrizi until recently when started having sharp pain reminiscent of prior episodes of inflammation.  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

## 2023-04-17 NOTE — HISTORY OF PRESENT ILLNESS
[FreeTextEntry1] : 36 yo F history of Crohn's diagnosed approximately 8 years ago, patient of Dr. Hobbs's, multiple abscesses, doing well on Skyrizi until recently when started having sharp pain reminiscent of prior episodes of inflammation.  Of note, patient had focal lymphoma on the right top most portion of her scalp and required radiation therapy.\par \par Initial dx:29-31yo\par If CD: + fistulizing, + abscesses,no strictures\par Presentation:pain, hospital, MRI\par Meds failed:Humira 1 y - head cyst thought lymphoma--bx 6 mo later not lymphoma--in pain, back on Humira--new abscess, stopped total use 2 y approx\par Remicade - 5-6 y\par Skyrizi 7/22-\par every 8 weeks\par pain started RLQ 1-2 weeks\par doesn't feel like an abscess\par no fever\par \par \par Current meds:\par Last date administered:due Fri\par Last level checked:\par Last colonoscopy:'14\par patchy (descending); \par Last imaging:\par \par Surgery:\par #1 7 cm abscess - drain (around 30 yo)\par #2 3-4 cm--PICC line (31 yo about)\par #3 antibiotics (around 34 yo)\par \par \par BM/D:2-3\par BM/night:0\par Blood:no\par Mucus:no\par Weight loss:no, increase weight (30 lb in 5 mo)\par Fatigue: +\par \par Joint aches:no\par Skin issues: dermatitis deshydrotic - pockets itchy under skin pick open even more itchy\par Eye issues:no\par \par TB:\par HBV:\par \par 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

## 2023-05-16 ENCOUNTER — TRANSCRIPTION ENCOUNTER (OUTPATIENT)
Age: 38
End: 2023-05-16

## 2023-05-17 ENCOUNTER — RESULT REVIEW (OUTPATIENT)
Age: 38
End: 2023-05-17

## 2023-05-17 ENCOUNTER — OUTPATIENT (OUTPATIENT)
Dept: OUTPATIENT SERVICES | Facility: HOSPITAL | Age: 38
LOS: 1 days | End: 2023-05-17
Payer: COMMERCIAL

## 2023-05-17 ENCOUNTER — APPOINTMENT (OUTPATIENT)
Dept: GASTROENTEROLOGY | Facility: GI CENTER | Age: 38
End: 2023-05-17
Payer: COMMERCIAL

## 2023-05-17 DIAGNOSIS — K50.80 CROHN'S DISEASE OF BOTH SMALL AND LARGE INTESTINE WITHOUT COMPLICATIONS: ICD-10-CM

## 2023-05-17 PROCEDURE — 88305 TISSUE EXAM BY PATHOLOGIST: CPT

## 2023-05-17 PROCEDURE — 45380 COLONOSCOPY AND BIOPSY: CPT

## 2023-05-17 PROCEDURE — 88305 TISSUE EXAM BY PATHOLOGIST: CPT | Mod: 26

## 2023-05-17 NOTE — ASSESSMENT
[FreeTextEntry1] : 36 yo F history of Crohn's diagnosed approximately 8 years ago, patient of Dr. Hobbs's, multiple abscesses, doing well on Skyrizi until recently when started having sharp pain reminiscent of prior episodes of inflammation. 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 \par \par The patient is medically optimized for procedure(s). All questions have been answered. The risks and benefits of the procedure have been discussed and the patient signed consent for the procedure. Preliminary results will be discussed when the patient wakes up from anesthesia, but definitive results will be discussed after the pathology report is issued in 5 business days.\par

## 2023-05-17 NOTE — HISTORY OF PRESENT ILLNESS
[FreeTextEntry1] : 38 yo F history of Crohn's diagnosed approximately 8 years ago, patient of Dr. Hobbs's, multiple abscesses, doing well on Skyrizi until recently when started having sharp pain reminiscent of prior episodes of inflammation. 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

## 2023-05-23 LAB — SURGICAL PATHOLOGY STUDY: SIGNIFICANT CHANGE UP

## 2023-05-30 ENCOUNTER — TRANSCRIPTION ENCOUNTER (OUTPATIENT)
Age: 38
End: 2023-05-30

## 2023-06-08 ENCOUNTER — TRANSCRIPTION ENCOUNTER (OUTPATIENT)
Age: 38
End: 2023-06-08

## 2023-06-08 LAB
25(OH)D3 SERPL-MCNC: 24.1 NG/ML
ALBUMIN SERPL ELPH-MCNC: 4.1 G/DL
ALP BLD-CCNC: 109 U/L
ALT SERPL-CCNC: 14 U/L
ANION GAP SERPL CALC-SCNC: 12 MMOL/L
AST SERPL-CCNC: 11 U/L
BILIRUB SERPL-MCNC: <0.2 MG/DL
BUN SERPL-MCNC: 8 MG/DL
CALCIUM SERPL-MCNC: 9.3 MG/DL
CHLORIDE SERPL-SCNC: 104 MMOL/L
CO2 SERPL-SCNC: 22 MMOL/L
CREAT SERPL-MCNC: 0.78 MG/DL
CRP SERPL-MCNC: 7 MG/L
EGFR: 99 ML/MIN/1.73M2
ERYTHROCYTE [SEDIMENTATION RATE] IN BLOOD BY WESTERGREN METHOD: 52 MM/HR
FERRITIN SERPL-MCNC: 61 NG/ML
GLUCOSE SERPL-MCNC: 107 MG/DL
HBV CORE IGM SER QL: NONREACTIVE
HBV SURFACE AB SER QL: NONREACTIVE
HBV SURFACE AG SER QL: NONREACTIVE
IRON SATN MFR SERPL: 19 %
IRON SERPL-MCNC: 50 UG/DL
POTASSIUM SERPL-SCNC: 4.6 MMOL/L
PROT SERPL-MCNC: 7.3 G/DL
SODIUM SERPL-SCNC: 138 MMOL/L
TIBC SERPL-MCNC: 264 UG/DL
UIBC SERPL-MCNC: 214 UG/DL

## 2023-06-15 ENCOUNTER — APPOINTMENT (OUTPATIENT)
Dept: GASTROENTEROLOGY | Facility: CLINIC | Age: 38
End: 2023-06-15
Payer: COMMERCIAL

## 2023-06-15 LAB
M TB IFN-G BLD-IMP: NEGATIVE
QUANTIFERON TB PLUS MITOGEN MINUS NIL: 3.8 IU/ML
QUANTIFERON TB PLUS NIL: 0.01 IU/ML
QUANTIFERON TB PLUS TB1 MINUS NIL: 0.01 IU/ML
QUANTIFERON TB PLUS TB2 MINUS NIL: 0.01 IU/ML

## 2023-06-15 PROCEDURE — 99443: CPT

## 2023-06-15 NOTE — ASSESSMENT
[FreeTextEntry1] : 38 F h/o scalp lymphoma s/p XRT at [facility] now in remission, CD not doing great on Skyrizi (was better  in terms of tolerating foods, urgency and fatigue on anti-TNF but had to stop due to lymphoma) came to see me for RLQ discomfort. Might need to switch off Skyrizi but options are limited - Stelara has sim mechanism of action and Entyvio doesn't have great SB efficacy. Rinvoq is actually approved for CD and UC, but there is a lymphoma risk with this. Xeljanz is UC only. Elevated infl markers.

## 2023-06-15 NOTE — HISTORY OF PRESENT ILLNESS
[FreeTextEntry1] : Visit type: Telephonic (phone only)\par Patient Location: [place], [place]\par Provider Location: [facility], [place] [address]\par Consent obtained for visit:Yes\par Visit length: 30 minutes\par Others present: No\par \par \par Initial dx:29-29yo\par If CD: + fistulizing, + abscesses,no strictures\par Presentation:pain, hospital, MRI\par Meds failed:Humira 1 y - head cyst thought lymphoma--bx 6 mo later not lymphoma--in pain, back on Humira--new abscess, stopped total use 2 y approx\par Remicade - 5-6 y (stopped because of concrete lymphoma dx )\par Current meds:Skyrizi -\par every 8 weeks\par Last date administered:approx 8 weeks ago\par Last level checked:n/a\par Last colonoscopy 23:\par The TI was difficult to cannulate. A tic, a polyp and an ulcerated area were \par visible in the 5 cm which were visualized. 4 cold forceps bx obtained there. The \par rest of the colon, apart from the erythema at 25 cm, had no evidence of disease \par activity. This was sampled segmentally, four cold forceps bx per segment: \par ascending, transverse, descending and recto-sigmoid. Small internal hemorrhoids, \par not bleeding, seen on retroflexion. .\par \par 1. Terminal ileum, biopsy\par - Colonic and small intestine type mucosa\par - Mild chronic active inflammation present\par -Negative for granuloma or dysplasia\par \par 2. Colon,  ascending , biopsy\par - Mild acute colitis with cryptitis\par - Lymphoid aggregates\par - Negative for granuloma, or dysplasia\par \par 3. Colon, transverse  , biopsy\par - Lymphoid aggregates\par - Negative for colitis, granuloma, or dysplasia\par \par 4. Colon,  descending  , biopsy\par - Lymphoid aggregates\par - Negative for colitis, granuloma, or dysplasia\par \par 5. Colon,   recto sigmoid , biopsy\par - Lymphoid aggregates\par - Negative for colitis, granuloma, or dysplasia\par \par 6. Colon,  sigmoid @ 25 cm  , biopsy\par - Mild acute colitis with cryptitis\par - Lymphoid aggregates\par - Negative granuloma, or dysplasia\par \par Colon '14 patchy (descending); \par Last imagin23 PET:\par uptake in TI ? appendix ?inflammatory\par \par 2019 CTE Anjel TI fistulae, stable since 2018 (complex ramifying fistulous tract)\par \par Surgery:\par #1 7 cm abscess - drain (around 30 yo)\par #2 3-4 cm--PICC line (33 yo about) - no drain\par #3 antibiotics (around 32 yo) - no drain\par \par \par BM/D:normal 2-3\par BM/night:0\par Blood:no\par Mucus:no\par Weight loss:stable (overweight c/w baseline)\par Fatigue: +\par \par Joint aches:no\par Skin issues: dermatitis deshydrotic - pockets itchy under skin pick open even more itchy (uses a steroid cream clobetasol for this)\par Eye issues:no\par \par TB:-ve \par HBV:-ve \par \par pain comes/goes\par doesn't last long - seconds\par sporadic\par no rel with BM or food\par no fever no drainage\par \par scalp growing hair no new cyst\par \par ESR 52 CRP 7\par iron nl, ferritin nl\par CMP nl\par vit D 24 no vit D supplements yet\par \par \par

## 2023-06-18 ENCOUNTER — TRANSCRIPTION ENCOUNTER (OUTPATIENT)
Age: 38
End: 2023-06-18

## 2023-06-18 LAB — CALPROTECTIN FECAL: 151 UG/G

## 2023-06-19 ENCOUNTER — RESULT REVIEW (OUTPATIENT)
Age: 38
End: 2023-06-19

## 2023-07-13 ENCOUNTER — OUTPATIENT (OUTPATIENT)
Dept: OUTPATIENT SERVICES | Facility: HOSPITAL | Age: 38
LOS: 1 days | End: 2023-07-13
Payer: COMMERCIAL

## 2023-07-13 ENCOUNTER — APPOINTMENT (OUTPATIENT)
Dept: MRI IMAGING | Facility: CLINIC | Age: 38
End: 2023-07-13
Payer: COMMERCIAL

## 2023-07-13 DIAGNOSIS — K50.80 CROHN'S DISEASE OF BOTH SMALL AND LARGE INTESTINE WITHOUT COMPLICATIONS: ICD-10-CM

## 2023-07-13 PROCEDURE — 72197 MRI PELVIS W/O & W/DYE: CPT | Mod: 26

## 2023-07-13 PROCEDURE — 72197 MRI PELVIS W/O & W/DYE: CPT

## 2023-07-13 PROCEDURE — 74183 MRI ABD W/O CNTR FLWD CNTR: CPT

## 2023-07-13 PROCEDURE — 74183 MRI ABD W/O CNTR FLWD CNTR: CPT | Mod: 26

## 2023-07-13 PROCEDURE — A9585: CPT

## 2023-08-02 ENCOUNTER — TRANSCRIPTION ENCOUNTER (OUTPATIENT)
Age: 38
End: 2023-08-02

## 2023-08-02 ENCOUNTER — NON-APPOINTMENT (OUTPATIENT)
Age: 38
End: 2023-08-02

## 2023-08-10 ENCOUNTER — TRANSCRIPTION ENCOUNTER (OUTPATIENT)
Age: 38
End: 2023-08-10

## 2023-08-11 ENCOUNTER — TRANSCRIPTION ENCOUNTER (OUTPATIENT)
Age: 38
End: 2023-08-11

## 2023-08-16 ENCOUNTER — APPOINTMENT (OUTPATIENT)
Dept: GASTROENTEROLOGY | Facility: CLINIC | Age: 38
End: 2023-08-16
Payer: COMMERCIAL

## 2023-08-16 PROCEDURE — 99443: CPT

## 2023-08-16 NOTE — HISTORY OF PRESENT ILLNESS
[FreeTextEntry1] : Visit type: Telephonic (phone only) Patient Location: [place], [place] Provider Location: [facility],  [place] [address] Consent obtained for visit:Yes Visit length: 30 minutes Others present: No  Initial dx:29-29yo  CD: + fistulizing, + abscesses,no strictures  Presentation:pain, hospital, MRI  Meds failed:Humira 1 y - head cyst thought lymphoma--bx 6 mo later not lymphoma--in pain, back on Humira--new abscess, stopped total use 2 y approx  Remicade - 5-6 y (stopped because of concrete lymphoma dx )  Current meds:Skyrizi 2022, every 8 weeks  Last date administered:   Last level checked:n/a  Last colonoscopy  23:  The TI was difficult to cannulate. A tic, a polyp and an ulcerated area were  visible in the 5 cm which were visualized. 4 cold forceps bx obtained there. The  rest of the colon, apart from the erythema at 25 cm, had no evidence of disease  activity. This was sampled segmentally, four cold forceps bx per segment:  ascending, transverse, descending and recto-sigmoid. Small internal hemorrhoids,  not bleeding, seen on retroflexion. .    1. Terminal ileum, biopsy  - Colonic and small intestine type mucosa  - Mild chronic active inflammation present  -Negative for granuloma or dysplasia    2. Colon, ascending , biopsy  - Mild acute colitis with cryptitis  - Lymphoid aggregates  - Negative for granuloma, or dysplasia    3. Colon, transverse , biopsy  - Lymphoid aggregates  - Negative for colitis, granuloma, or dysplasia    4. Colon, descending , biopsy  - Lymphoid aggregates  - Negative for colitis, granuloma, or dysplasia    5. Colon, recto sigmoid , biopsy  - Lymphoid aggregates  - Negative for colitis, granuloma, or dysplasia    6. Colon, sigmoid @ 25 cm , biopsy  - Mild acute colitis with cryptitis  - Lymphoid aggregates  - Negative granuloma, or dysplasia    Colon '14 patchy (descending);  Last imagin/23 MRI: IMPRESSION: *  Penetrating Crohn's disease as described above with enteroenteric and enterocolonic fistulas, not significantly changed from prior CT. Mild active inflammation. No abscess. *  The patient declined oral contrast resulting in suboptimal bowel distention. However, no evidence of upstream bowel dilatation to suggest a significant fibrostenotic stricture. 23 PET:  uptake in TI ? 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)    Surgery:  #1 7 cm abscess - drain (around 32 yo)  #2 3-4 cm--PICC line (31 yo about) - no drain  #3 antibiotics (around 32 yo) - no drain      BM/D:normal 2-3  BM/night:0  Blood:no  Mucus:no  Weight loss:stable (overweight c/w baseline)-gradual gain  Fatigue: +    Joint aches:no  Skin issues: dermatitis deshydrotic - pockets itchy under skin pick open even more itchy (uses a steroid cream clobetasol for this)  Eye issues:no    TB:-ve   HBV:-ve     pain comes/goes  doesn't last long - seconds  sporadic  no rel with BM or food  30 min last night to fall asleep from pain   scalp growing hair no new cyst--Oct PET  2023:  ESR 52 CRP 7  iron nl, ferritin nl  CMP nl  vit D 24 no vit D supplements yet  Romy called her back  appointment

## 2023-08-16 NOTE — PLAN
[TextEntry] : 1. f/u surgery visit - she is thinking of this as last resort, told her to see what the surgeon thinks her recovery would be like since limited biologic options because of lymphoma 2. f/u ozempic plans - pt wants to try to lose weight and see if has 2nary benefit in terms of CD mgmt 3. explained again Entyvio would be my suggestion rather than Skyrizi, but would also not change multiple things at the same time - if she is determined to try ozempic, we can give this 2-3 mo and recheck inflammation/imaging then change biologics 4. office will call her to set up telephonic in 1-2 mo

## 2023-08-16 NOTE — ASSESSMENT
[FreeTextEntry1] : 38 F scalp lymphoma currently in remission, CD with fistulae, failing Skyrizi, here to discuss surgery vs Entyvio. No surgery visit since last [facility] communication.

## 2023-08-24 ENCOUNTER — NON-APPOINTMENT (OUTPATIENT)
Age: 38
End: 2023-08-24

## 2023-08-24 ENCOUNTER — APPOINTMENT (OUTPATIENT)
Dept: FAMILY MEDICINE | Facility: CLINIC | Age: 38
End: 2023-08-24
Payer: COMMERCIAL

## 2023-08-24 VITALS
WEIGHT: 228 LBS | DIASTOLIC BLOOD PRESSURE: 62 MMHG | BODY MASS INDEX: 37.99 KG/M2 | TEMPERATURE: 98 F | SYSTOLIC BLOOD PRESSURE: 94 MMHG | OXYGEN SATURATION: 99 % | HEART RATE: 83 BPM | HEIGHT: 65 IN

## 2023-08-24 DIAGNOSIS — K52.9 NONINFECTIVE GASTROENTERITIS AND COLITIS, UNSPECIFIED: ICD-10-CM

## 2023-08-24 DIAGNOSIS — Z76.89 PERSONS ENCOUNTERING HEALTH SERVICES IN OTHER SPECIFIED CIRCUMSTANCES: ICD-10-CM

## 2023-08-24 DIAGNOSIS — C85.90 NON-HODGKIN LYMPHOMA, UNSPECIFIED, UNSPECIFIED SITE: ICD-10-CM

## 2023-08-24 DIAGNOSIS — Z80.1 FAMILY HISTORY OF MALIGNANT NEOPLASM OF TRACHEA, BRONCHUS AND LUNG: ICD-10-CM

## 2023-08-24 PROCEDURE — 99203 OFFICE O/P NEW LOW 30 MIN: CPT

## 2023-08-24 NOTE — HEALTH RISK ASSESSMENT
[No] : In the past 12 months have you used drugs other than those required for medical reasons? No [Current] : Current [de-identified] : 1 pack for 12 years

## 2023-08-24 NOTE — HISTORY OF PRESENT ILLNESS
[FreeTextEntry8] : ADAN LOTT 38 year yrs old  female presents office comes to Establish care. Patient have been following [place] [facility] [place] got 2 rounds of radiation in the scalp and because her crohns medication  Now taking  Skrizi every 8 weeks and her crohns have been acting up and as she is off immunomodulators  and when she eats is in pain  Works in accounting at [employer]  Has sang with the surgery for the removal of intestine  Patient states she was recommended Ozempic for her crohs by her GI  Denies any other complaints. No Fever, Chills, Nausea, Vomiting, Diarrhea, Headache, Chest Pain, Shortness of breath or Abdominal pain.

## 2023-08-24 NOTE — ASSESSMENT
[FreeTextEntry1] : Establish care  -Will do BW   Lymphoma  -following [facility] oncology    Crohn's  -following GI  -currently on  Skrizi  -AS PATIENT HAS CROHNS AND OZEMPIC IS RECOMMNEDED FOR DM AS SHE IS COMPLICATED CASE ADVISED TO FOLLOW WITH ENDOCRINOLOGY FOR RECOMMNEDATIONS AND GIVEN REFERRAL AND PROVIDED NUMBERS  Obesity -Loose weight and exercise -Watch diet -Lifestyle modifications -Mediterranean diet  -5 A approach for Counselling   RTO for follow up for CPE

## 2023-08-28 ENCOUNTER — TRANSCRIPTION ENCOUNTER (OUTPATIENT)
Age: 38
End: 2023-08-28

## 2023-08-31 ENCOUNTER — APPOINTMENT (OUTPATIENT)
Dept: SURGERY | Facility: CLINIC | Age: 38
End: 2023-08-31
Payer: COMMERCIAL

## 2023-08-31 VITALS
HEART RATE: 93 BPM | TEMPERATURE: 97.3 F | RESPIRATION RATE: 17 BRPM | DIASTOLIC BLOOD PRESSURE: 68 MMHG | SYSTOLIC BLOOD PRESSURE: 123 MMHG | OXYGEN SATURATION: 98 %

## 2023-08-31 DIAGNOSIS — E66.9 OBESITY, UNSPECIFIED: ICD-10-CM

## 2023-08-31 DIAGNOSIS — K50.80 CROHN'S DISEASE OF BOTH SMALL AND LARGE INTESTINE W/OUT COMPLICATIONS: ICD-10-CM

## 2023-08-31 DIAGNOSIS — F17.200 NICOTINE DEPENDENCE, UNSPECIFIED, UNCOMPLICATED: ICD-10-CM

## 2023-08-31 PROCEDURE — 99205 OFFICE O/P NEW HI 60 MIN: CPT

## 2023-08-31 RX ORDER — MUPIROCIN 20 MG/G
2 OINTMENT TOPICAL
Qty: 22 | Refills: 0 | Status: DISCONTINUED | COMMUNITY
Start: 2021-09-23 | End: 2023-08-31

## 2023-08-31 RX ORDER — INFLIXIMAB-DYYB 100 MG/10ML
100 INJECTION, POWDER, LYOPHILIZED, FOR SOLUTION INTRAVENOUS AS DIRECTED
Qty: 10 | Refills: 6 | Status: DISCONTINUED | COMMUNITY
Start: 2022-02-23 | End: 2023-08-31

## 2023-08-31 RX ORDER — RISANKIZUMAB-RZAA 60 MG/ML
600 INJECTION INTRAVENOUS
Qty: 10 | Refills: 0 | Status: DISCONTINUED | COMMUNITY
Start: 2022-10-12 | End: 2023-08-31

## 2023-08-31 RX ORDER — RISANKIZUMAB-RZAA 60 MG/ML
600 INJECTION INTRAVENOUS
Qty: 3 | Refills: 2 | Status: DISCONTINUED | COMMUNITY
Start: 2022-06-30 | End: 2023-08-31

## 2023-08-31 RX ORDER — POLYETHYLENE GLYCOL 3350 17 G/17G
17 POWDER, FOR SOLUTION ORAL
Qty: 1 | Refills: 0 | Status: DISCONTINUED | COMMUNITY
Start: 2023-04-17 | End: 2023-08-31

## 2023-08-31 RX ORDER — BUDESONIDE 9 MG/1
9 TABLET, EXTENDED RELEASE ORAL
Qty: 30 | Refills: 2 | Status: DISCONTINUED | COMMUNITY
Start: 2022-07-27 | End: 2023-08-31

## 2023-08-31 NOTE — CONSULT LETTER
[Dear  ___] : Dear ~SUSHMA, [Courtesy Letter:] : I had the pleasure of seeing your patient, [unfilled], in my office today. [Please see my note below.] : Please see my note below. [Consult Closing:] : Thank you very much for allowing me to participate in the care of this patient.  If you have any questions, please do not hesitate to contact me. [Sincerely,] : Sincerely, [FreeTextEntry2] : Dr. Lynn Clay [FreeTextEntry3] : Miguel Stanton M.D., F.REBEKA.C.S., F.A.S.C.R.S. Chief Colorectal Clinical Services, [facility]

## 2023-08-31 NOTE — PHYSICAL EXAM
[Normal Breath Sounds] : Normal breath sounds [Normal Heart Sounds] : normal heart sounds [Alert] : alert [Oriented to Person] : oriented to person [Oriented to Place] : oriented to place [Oriented to Time] : oriented to time [Calm] : calm [Abdomen Masses] : No abdominal masses [Abdomen Tenderness] : ~T No ~M abdominal tenderness [de-identified] : Obese [de-identified] : WNL [de-identified] : WNL [de-identified] : HOLLIL [de-identified] : WNL ROM [de-identified] : WNL

## 2023-08-31 NOTE — HISTORY OF PRESENT ILLNESS
[FreeTextEntry1] : Ericka is a 39 y/o female here for a consultation visit, Crohn's.    Meds failed: Humira and Remicade (stopped Remicade because of lymphoma dx 5/22)  Current meds: Skyrizi August 2022, every 8 weeks  Has lymphoma. RT completed in Nov/Dec 2022.    Colonoscopy 02/24/14 (evaluate Crohn's colitis) - Reg enteritis  CT Enterography 06/29/19 (Crohn's ileocolitis, on Remicade, now with recurrent symptoms) - Active exacerbation of Crohn's ileocolitis with enterocolic and enteroenteric fistula.  No new area of involvement compared with MRI of 02/27/18.    Colonoscopy on 5/17/23 - There was focal erythema at 25 cm, which was biopsied using cold forceps bx. The TI was difficult to cannulate, A tic, a polyp and an ulcerated area were visible in the 5 cm which were visualized. 4 cold forceps bx obtained there. The rest of the colon, apart from the erythema at 25 cm, had no evidence of disease activity. This was sampled segmentally, four cold forceps bx per segment: ascending, transverse, descending and recto-sigmoid. Small internal hemorrhoids, not bleeding, seen on retroflexion.  Pathology - 1. Terminal ileum, biopsy - Colonic and small intestine type mucosa - Mild chronic active inflammation present -Negative for granuloma or dysplasia 2. Colon, ascending , biopsy - Mild acute colitis with cryptitis - Lymphoid aggregates - Negative for granuloma, or dysplasia 3. Colon, transverse , biopsy - Lymphoid aggregates - Negative for colitis, granuloma, or dysplasia 4. Colon, descending , biopsy - Lymphoid aggregates - Negative for colitis, granuloma, or dysplasia 5. Colon, recto sigmoid , biopsy - Lymphoid aggregates - Negative for colitis, granuloma, or dysplasia 6. Colon, sigmoid @ 25 cm , biopsy - Mild acute colitis with cryptitis - Lymphoid aggregates - Negative granuloma, or dysplasia  MR A+P on 7/13/23 -  Penetrating Crohn's disease as described above with enteroenteric and enterocolonic fistulas, not significantly changed from prior CT. Mild active inflammation. No abscess. The patient declined oral contrast resulting in suboptimal bowel distention. However, no evidence of upstream bowel dilatation to suggest a significant fibrostenotic stricture.  Today pt reports no pain. Daily BMs, formed, no straining, denies pain, no bleeding, and denies feeling swollen or prolapsed tissue. Denies nausea and vomiting. Denies fever and chills. Good appetite denies any unintentional weight loss. Avoids eating cold meats, corn, greasy, and oily food. Not taking any anticoagulants. Hospitalized for 2 episodes, once when pt was diagnosed and then shortly after for an abscess of colon. Diagnosed 10 years ago. When pt has flare ups, she has RLQ pain (mostly after meals) and sometimes diarrhea. Doesn't feel that current Crohn's medication is working.  Patient smokes 1 pack a day of cigarettes.

## 2023-09-01 ENCOUNTER — TRANSCRIPTION ENCOUNTER (OUTPATIENT)
Age: 38
End: 2023-09-01

## 2023-09-11 ENCOUNTER — TRANSCRIPTION ENCOUNTER (OUTPATIENT)
Age: 38
End: 2023-09-11

## 2023-09-11 ENCOUNTER — APPOINTMENT (OUTPATIENT)
Dept: GASTROENTEROLOGY | Facility: CLINIC | Age: 38
End: 2023-09-11

## 2023-09-18 ENCOUNTER — TRANSCRIPTION ENCOUNTER (OUTPATIENT)
Age: 38
End: 2023-09-18

## 2023-09-18 RX ORDER — RISANKIZUMAB-RZAA 360 MG/2.4
360 KIT SUBCUTANEOUS
Qty: 6 | Refills: 1 | Status: ACTIVE | COMMUNITY
Start: 2022-08-30 | End: 1900-01-01

## 2023-09-19 ENCOUNTER — TRANSCRIPTION ENCOUNTER (OUTPATIENT)
Age: 38
End: 2023-09-19

## 2023-09-29 ENCOUNTER — TRANSCRIPTION ENCOUNTER (OUTPATIENT)
Age: 38
End: 2023-09-29

## 2024-10-15 ENCOUNTER — RX RENEWAL (OUTPATIENT)
Age: 39
End: 2024-10-15

## 2024-10-29 ENCOUNTER — TRANSCRIPTION ENCOUNTER (OUTPATIENT)
Age: 39
End: 2024-10-29

## 2024-10-29 DIAGNOSIS — K50.80 CROHN'S DISEASE OF BOTH SMALL AND LARGE INTESTINE W/OUT COMPLICATIONS: ICD-10-CM

## (undated) DEVICE — FORCEP ENDOJAW AGTR LC W NDL 2.8MM 230CM DISP

## (undated) DEVICE — STERIS DEFENDO 3-PIECE KIT (AIR/WATER, SUCTION & BIOPSY VALVES)